# Patient Record
Sex: FEMALE | Race: WHITE | NOT HISPANIC OR LATINO | Employment: UNEMPLOYED | URBAN - METROPOLITAN AREA
[De-identification: names, ages, dates, MRNs, and addresses within clinical notes are randomized per-mention and may not be internally consistent; named-entity substitution may affect disease eponyms.]

---

## 2017-12-29 ENCOUNTER — HOSPITAL ENCOUNTER (EMERGENCY)
Facility: MEDICAL CENTER | Age: 22
End: 2017-12-30
Attending: EMERGENCY MEDICINE
Payer: MEDICAID

## 2017-12-29 ENCOUNTER — APPOINTMENT (OUTPATIENT)
Dept: RADIOLOGY | Facility: MEDICAL CENTER | Age: 22
End: 2017-12-29
Attending: EMERGENCY MEDICINE
Payer: MEDICAID

## 2017-12-29 DIAGNOSIS — N30.00 ACUTE CYSTITIS WITHOUT HEMATURIA: ICD-10-CM

## 2017-12-29 DIAGNOSIS — R10.9 ABDOMINAL PAIN IN PREGNANCY, SECOND TRIMESTER: ICD-10-CM

## 2017-12-29 DIAGNOSIS — O26.892 ABDOMINAL PAIN IN PREGNANCY, SECOND TRIMESTER: ICD-10-CM

## 2017-12-29 LAB
ALBUMIN SERPL BCP-MCNC: 3.6 G/DL (ref 3.2–4.9)
ALBUMIN/GLOB SERPL: 1.4 G/DL
ALP SERPL-CCNC: 37 U/L (ref 30–99)
ALT SERPL-CCNC: 7 U/L (ref 2–50)
ANION GAP SERPL CALC-SCNC: 5 MMOL/L (ref 0–11.9)
APPEARANCE UR: ABNORMAL
AST SERPL-CCNC: 10 U/L (ref 12–45)
BACTERIA #/AREA URNS HPF: ABNORMAL /HPF
BACTERIA GENITAL QL WET PREP: NORMAL
BASOPHILS # BLD AUTO: 0.4 % (ref 0–1.8)
BASOPHILS # BLD: 0.02 K/UL (ref 0–0.12)
BILIRUB SERPL-MCNC: 0.5 MG/DL (ref 0.1–1.5)
BILIRUB UR QL STRIP.AUTO: NEGATIVE
BUN SERPL-MCNC: 12 MG/DL (ref 8–22)
CALCIUM SERPL-MCNC: 9.4 MG/DL (ref 8.5–10.5)
CAOX CRY #/AREA URNS HPF: ABNORMAL /HPF
CHLORIDE SERPL-SCNC: 106 MMOL/L (ref 96–112)
CO2 SERPL-SCNC: 25 MMOL/L (ref 20–33)
COLOR UR: YELLOW
CREAT SERPL-MCNC: 0.61 MG/DL (ref 0.5–1.4)
CULTURE IF INDICATED INDCX: YES UA CULTURE
EOSINOPHIL # BLD AUTO: 0.06 K/UL (ref 0–0.51)
EOSINOPHIL NFR BLD: 1.3 % (ref 0–6.9)
EPI CELLS #/AREA URNS HPF: ABNORMAL /HPF
ERYTHROCYTE [DISTWIDTH] IN BLOOD BY AUTOMATED COUNT: 46.5 FL (ref 35.9–50)
GFR SERPL CREATININE-BSD FRML MDRD: >60 ML/MIN/1.73 M 2
GLOBULIN SER CALC-MCNC: 2.6 G/DL (ref 1.9–3.5)
GLUCOSE SERPL-MCNC: 76 MG/DL (ref 65–99)
GLUCOSE UR STRIP.AUTO-MCNC: NEGATIVE MG/DL
HCG SERPL QL: POSITIVE
HCT VFR BLD AUTO: 34.1 % (ref 37–47)
HGB BLD-MCNC: 11.6 G/DL (ref 12–16)
HYALINE CASTS #/AREA URNS LPF: ABNORMAL /LPF
IMM GRANULOCYTES # BLD AUTO: 0.01 K/UL (ref 0–0.11)
IMM GRANULOCYTES NFR BLD AUTO: 0.2 % (ref 0–0.9)
KETONES UR STRIP.AUTO-MCNC: NEGATIVE MG/DL
LEUKOCYTE ESTERASE UR QL STRIP.AUTO: ABNORMAL
LIPASE SERPL-CCNC: 33 U/L (ref 11–82)
LYMPHOCYTES # BLD AUTO: 1.41 K/UL (ref 1–4.8)
LYMPHOCYTES NFR BLD: 29.6 % (ref 22–41)
MCH RBC QN AUTO: 32 PG (ref 27–33)
MCHC RBC AUTO-ENTMCNC: 34 G/DL (ref 33.6–35)
MCV RBC AUTO: 94.2 FL (ref 81.4–97.8)
MICRO URNS: ABNORMAL
MONOCYTES # BLD AUTO: 0.41 K/UL (ref 0–0.85)
MONOCYTES NFR BLD AUTO: 8.6 % (ref 0–13.4)
NEUTROPHILS # BLD AUTO: 2.85 K/UL (ref 2–7.15)
NEUTROPHILS NFR BLD: 59.9 % (ref 44–72)
NITRITE UR QL STRIP.AUTO: NEGATIVE
NRBC # BLD AUTO: 0 K/UL
NRBC BLD-RTO: 0 /100 WBC
PH UR STRIP.AUTO: 6 [PH]
PLATELET # BLD AUTO: 174 K/UL (ref 164–446)
PMV BLD AUTO: 11.2 FL (ref 9–12.9)
POTASSIUM SERPL-SCNC: 3.9 MMOL/L (ref 3.6–5.5)
PROT SERPL-MCNC: 6.2 G/DL (ref 6–8.2)
PROT UR QL STRIP: NEGATIVE MG/DL
RBC # BLD AUTO: 3.62 M/UL (ref 4.2–5.4)
RBC # URNS HPF: ABNORMAL /HPF
RBC UR QL AUTO: NEGATIVE
SIGNIFICANT IND 70042: NORMAL
SITE SITE: NORMAL
SODIUM SERPL-SCNC: 136 MMOL/L (ref 135–145)
SOURCE SOURCE: NORMAL
SP GR UR STRIP.AUTO: 1.02
UROBILINOGEN UR STRIP.AUTO-MCNC: 0.2 MG/DL
WBC # BLD AUTO: 4.8 K/UL (ref 4.8–10.8)
WBC #/AREA URNS HPF: ABNORMAL /HPF

## 2017-12-29 PROCEDURE — 87591 N.GONORRHOEAE DNA AMP PROB: CPT

## 2017-12-29 PROCEDURE — 81001 URINALYSIS AUTO W/SCOPE: CPT

## 2017-12-29 PROCEDURE — 85025 COMPLETE CBC W/AUTO DIFF WBC: CPT

## 2017-12-29 PROCEDURE — 36415 COLL VENOUS BLD VENIPUNCTURE: CPT

## 2017-12-29 PROCEDURE — 84702 CHORIONIC GONADOTROPIN TEST: CPT

## 2017-12-29 PROCEDURE — 99284 EMERGENCY DEPT VISIT MOD MDM: CPT

## 2017-12-29 PROCEDURE — A9270 NON-COVERED ITEM OR SERVICE: HCPCS | Performed by: EMERGENCY MEDICINE

## 2017-12-29 PROCEDURE — 87491 CHLMYD TRACH DNA AMP PROBE: CPT

## 2017-12-29 PROCEDURE — 700102 HCHG RX REV CODE 250 W/ 637 OVERRIDE(OP): Performed by: EMERGENCY MEDICINE

## 2017-12-29 PROCEDURE — 83690 ASSAY OF LIPASE: CPT

## 2017-12-29 PROCEDURE — 80053 COMPREHEN METABOLIC PANEL: CPT

## 2017-12-29 PROCEDURE — 87086 URINE CULTURE/COLONY COUNT: CPT

## 2017-12-29 PROCEDURE — 84703 CHORIONIC GONADOTROPIN ASSAY: CPT

## 2017-12-29 RX ORDER — NITROFURANTOIN 25; 75 MG/1; MG/1
100 CAPSULE ORAL ONCE
Status: COMPLETED | OUTPATIENT
Start: 2017-12-29 | End: 2017-12-29

## 2017-12-29 RX ADMIN — NITROFURANTOIN (MONOHYDRATE/MACROCRYSTALS) 100 MG: 75; 25 CAPSULE ORAL at 22:52

## 2017-12-30 VITALS
WEIGHT: 188.93 LBS | TEMPERATURE: 98 F | BODY MASS INDEX: 26.45 KG/M2 | HEIGHT: 71 IN | SYSTOLIC BLOOD PRESSURE: 105 MMHG | HEART RATE: 79 BPM | OXYGEN SATURATION: 98 % | DIASTOLIC BLOOD PRESSURE: 53 MMHG | RESPIRATION RATE: 16 BRPM

## 2017-12-30 LAB
B-HCG SERPL-ACNC: ABNORMAL MIU/ML (ref 0–5)
C TRACH DNA SPEC QL NAA+PROBE: NEGATIVE
N GONORRHOEA DNA SPEC QL NAA+PROBE: NEGATIVE
SPECIMEN SOURCE: NORMAL

## 2017-12-30 PROCEDURE — 76817 TRANSVAGINAL US OBSTETRIC: CPT

## 2017-12-30 RX ORDER — NITROFURANTOIN 25; 75 MG/1; MG/1
100 CAPSULE ORAL 2 TIMES DAILY
Qty: 14 CAP | Refills: 0 | Status: SHIPPED | OUTPATIENT
Start: 2017-12-30 | End: 2018-01-06

## 2017-12-30 NOTE — ED NOTES
Pt ambulatory to triage. Pt states that she is pregnant, unsure about due date. LMP October. Denies pre-elsi care. Pt states that she has a shooting pain from bilateral lower abdomen that radiates to bilateral upper quadrant abdomen. Pt has had 2 other pregnancies and never experienced this pain before. Pt states that the pain has been occurring for about a month and is worse when she coughs and sneezes. Denies vaginal bleeding.     Chief Complaint   Patient presents with   • Pregnancy   • Abdominal Pain     Pt placed in lobby, updated on triage process. Pt educated to notified RN or triage tech if changes in condition.

## 2017-12-30 NOTE — ED PROVIDER NOTES
"ED Provider Note    Scribed for Alexsander Grubbs M.D. by Alexsander Grubbs. 2017,  10:10 PM.    CHIEF COMPLAINT  Chief Complaint   Patient presents with   • Pregnancy   • Abdominal Pain       HPI  Natalie Pop is a 22 y.o.  female who presents to the Emergency Department for about 1 month of bilateral crampy lower quadrant abdominal pain. Pain is moderate, worse with coughing or Valsalva, not associated with dysuria, hematuria, constipation or diarrhea. Pain is nonradiating. She reports that this is new compared to her previous pregnancies. She denies vaginal bleeding. She reports more than physiologic whitish vaginal discharge.    Previous OBGYN was Erich Beltran. She has a 1st ObGyn appointment with [facility] on , just a few days from now.    REVIEW OF SYSTEMS  See HPI for further details. All other systems are negative.     PAST MEDICAL HISTORY   has a past medical history of Chlamydia infection (); Inflammatory disease of ovary, fallopian tube, pelvic cellular tissue, and peritoneum; and Tobacco abuse.    SOCIAL HISTORY  Social History     Social History Main Topics   • Smoking status: Current Every Day Smoker     Packs/day: 1.00     Years: 7.00     Types: Cigarettes     Start date: 2015   • Smokeless tobacco: Never Used   • Alcohol use No   • Drug use: No   • Sexual activity: Not on file     History   Drug Use No       SURGICAL HISTORY   has a past surgical history that includes gyn surgery () and repeat c section (2015).    CURRENT MEDICATIONS  Home Medications    **Home medications have not yet been reviewed for this encounter**         ALLERGIES  Allergies   Allergen Reactions   • Peanuts [Peanut Oil] Anaphylaxis   • Pcn [Penicillins]        PHYSICAL EXAM  VITAL SIGNS: /53   Pulse 79   Temp 36.7 °C (98 °F)   Resp 16   Ht 1.803 m (5' 11\")   Wt 85.7 kg (188 lb 15 oz)   LMP 10/29/2017 (Approximate)   SpO2 98%   BMI 26.35 kg/m²   Pulse " ox interpretation: I interpret this pulse ox as normal.  Constitutional: Alert in no apparent distress.  HENT: No signs of trauma, Bilateral external ears normal, Nose normal.   Eyes: Conjunctiva normal, Non-icteric.   Neck: Normal range of motion, Supple, No stridor.   Lymphatic: No lymphadenopathy noted.   Cardiovascular: Regular rate and rhythm, no murmurs.   Thorax & Lungs: Normal breath sounds, No respiratory distress, No wheezing, No chest tenderness.   Abdomen: Gravid abdomen, well above the pelvic brim, near the umbilicus, suggestive of dates more advanced than by LMP, Bowel sounds normal, Soft, mild suprapubic tenderness, No masses, No pulsatile masses. No peritoneal signs.  Pelvic: Normal external genitalia. Normal vaginal canal, normal gravid closed cervix. Slightly more than physiologic amount of white tinged, mostly mucoid discharge.  Skin: Warm, Dry, No erythema, No rash.   Back: No midline bony tenderness.   Extremities: Intact distal pulses, No edema, No cyanosis.  Musculoskeletal: Good range of motion in all major joints. No or major deformities noted.   Neurologic: Alert , Normal motor function, Normal sensory function, No focal deficits noted.   Psychiatric: Affect normal, Judgment normal, Mood normal.     DIAGNOSTIC STUDIES / PROCEDURES      LABS  Labs Reviewed   CBC WITH DIFFERENTIAL - Abnormal; Notable for the following:        Result Value    RBC 3.62 (*)     Hemoglobin 11.6 (*)     Hematocrit 34.1 (*)     All other components within normal limits   COMP METABOLIC PANEL - Abnormal; Notable for the following:     AST(SGOT) 10 (*)     All other components within normal limits   URINALYSIS,CULTURE IF INDICATED - Abnormal; Notable for the following:     Character Cloudy (*)     Leukocyte Esterase Trace (*)     All other components within normal limits   HCG QUAL SERUM - Abnormal; Notable for the following:     Beta-Hcg Qualitative Serum Positive (*)     All other components within normal limits    URINE MICROSCOPIC (W/UA) - Abnormal; Notable for the following:     WBC 5-10 (*)     Bacteria Moderate (*)     Epithelial Cells Many (*)     All other components within normal limits   LIPASE   URINE CULTURE(NEW)   ESTIMATED GFR   HCG QUANTITATIVE   WET PREP   CHLAMYDIA/GC PCR URINE OR SWAB     All labs reviewed by me.    RADIOLOGY  US-OB PELVIS TRANSVAGINAL   Final Result         1.  Live intrauterine pregnancy at calculated gestational age 17 weeks 3 days for estimated date of delivery June 5, 2018.   2.  No gross anatomic abnormality on very limited anatomic survey. Recommend complete anatomic survey at 20 weeks.        The radiologist's interpretation of all radiological studies have been reviewed by me.    COURSE & MEDICAL DECISION MAKING  Nursing notes, VS, PMSFHx reviewed in chart.     10:10 PM Patient seen and examined at bedside. Differential diagnosis includes but is not limited toAbdominal pain in pregnancy, urinary tract infection, pelvic inflammatory disease/pelvic infection/sinusitis. Ordered for transvaginal ultrasound, laboratory tests including urinalysis to evaluate.    1:34 AM this patient's ultrasound confirms a normal appearing live IUP at 17 weeks 3 days. It is otherwise unremarkable. The laboratory tests are positive for urinary tract infection, which we started treating with Bactrim here, and will discharge with a prescription for Bactrim. I reviewed the patient's findings with her, and her boyfriend, at the bedside. We discussed quitting smoking, which we had discussed at her initial history and physical, as well as the antibiotics she is being given, Tylenol for aches and pains, and the importance of follow-up at her initial prenatal visit on January 2.     The patient will return for new or worsening symptoms and is stable at the time of discharge.    The patient is referred to a primary physician for blood pressure management, diabetic screening, and for all other preventative health  concerns.      DISPOSITION:  Patient will be discharged home in stable condition.    FOLLOW UP:  [facility]  [address] [place] [address] [address]  [facility] [id number] [phone]  Call in 1 day        OUTPATIENT MEDICATIONS:  New Prescriptions    NITROFURANTOIN MONOHYDR MACRO (MACROBID) 100 MG CAP    Take 1 Cap by mouth 2 times a day for 7 days.         FINAL IMPRESSION  1. Acute cystitis without hematuria    2. Abdominal pain in pregnancy, second trimester

## 2017-12-30 NOTE — DISCHARGE INSTRUCTIONS
Your ultrasound showed a healthy-appearing pregnancy, at 4 months, one week, and 3 days. You also have a urinary tract infection. Your labs did not show any other pelvic infections or other problems. Use the antibiotics we have prescribed. You can take Tylenol during pregnancy for aches and pains.  It is very important that you quit smoking. Follow-up at your upcoming ObGyn appointment. Return to the emergency department for severe symptoms that cannot be managed by your regular doctor.    Abdominal Pain During Pregnancy  Belly (abdominal) pain is common during pregnancy. Most of the time, it is not a serious problem. Other times, it can be a sign that something is wrong with the pregnancy. Always tell your doctor if you have belly pain.  HOME CARE  Monitor your belly pain for any changes. The following actions may help you feel better:  · Do not have sex (intercourse) or put anything in your vagina until you feel better.  · Rest until your pain stops.  · Drink clear fluids if you feel sick to your stomach (nauseous). Do not eat solid food until you feel better.  · Only take medicine as told by your doctor.  · Keep all doctor visits as told.  GET HELP RIGHT AWAY IF:   · You are bleeding, leaking fluid, or pieces of tissue come out of your vagina.  · You have more pain or cramping.  · You keep throwing up (vomiting).  · You have pain when you pee (urinate) or have blood in your pee.  · You have a fever.  · You do not feel your baby moving as much.  · You feel very weak or feel like passing out.  · You have trouble breathing, with or without belly pain.  · You have a very bad headache and belly pain.  · You have fluid leaking from your vagina and belly pain.  · You keep having watery poop (diarrhea).  · Your belly pain does not go away after resting, or the pain gets worse.  MAKE SURE YOU:   · Understand these instructions.  · Will watch your condition.  · Will get help right away if you are not doing well or get  worse.     This information is not intended to replace advice given to you by your health care provider. Make sure you discuss any questions you have with your health care provider.     Document Released: 12/06/2010 Document Revised: 08/20/2014 Document Reviewed: 07/17/2014  Elsevier Interactive Patient Education ©2016 [employer]

## 2017-12-31 LAB
BACTERIA UR CULT: NORMAL
SIGNIFICANT IND 70042: NORMAL
SITE SITE: NORMAL
SOURCE SOURCE: NORMAL

## 2018-01-05 ENCOUNTER — APPOINTMENT (OUTPATIENT)
Dept: OBGYN | Facility: CLINIC | Age: 23
End: 2018-01-05

## 2018-01-25 ENCOUNTER — INITIAL PRENATAL (OUTPATIENT)
Dept: OBGYN | Facility: CLINIC | Age: 23
End: 2018-01-25
Payer: MEDICAID

## 2018-01-25 ENCOUNTER — HOSPITAL ENCOUNTER (OUTPATIENT)
Dept: LAB | Facility: MEDICAL CENTER | Age: 23
End: 2018-01-25
Attending: NURSE PRACTITIONER
Payer: MEDICAID

## 2018-01-25 ENCOUNTER — HOSPITAL ENCOUNTER (OUTPATIENT)
Facility: MEDICAL CENTER | Age: 23
End: 2018-01-25
Attending: NURSE PRACTITIONER
Payer: MEDICAID

## 2018-01-25 VITALS
WEIGHT: 190 LBS | SYSTOLIC BLOOD PRESSURE: 102 MMHG | DIASTOLIC BLOOD PRESSURE: 50 MMHG | BODY MASS INDEX: 26.6 KG/M2 | HEIGHT: 71 IN

## 2018-01-25 DIAGNOSIS — Z34.82 ENCOUNTER FOR SUPERVISION OF OTHER NORMAL PREGNANCY IN SECOND TRIMESTER: ICD-10-CM

## 2018-01-25 DIAGNOSIS — O34.219 HISTORY OF CESAREAN DELIVERY AFFECTING PREGNANCY: ICD-10-CM

## 2018-01-25 LAB
ABO GROUP BLD: NORMAL
APPEARANCE UR: CLEAR
APPEARANCE UR: NORMAL
BACTERIA #/AREA URNS HPF: NEGATIVE /HPF
BASOPHILS # BLD AUTO: 0.2 % (ref 0–1.8)
BASOPHILS # BLD: 0.01 K/UL (ref 0–0.12)
BILIRUB UR QL STRIP.AUTO: NEGATIVE
BILIRUB UR STRIP-MCNC: NORMAL MG/DL
BLD GP AB SCN SERPL QL: NORMAL
COLOR UR AUTO: NORMAL
COLOR UR: YELLOW
CULTURE IF INDICATED INDCX: YES UA CULTURE
EOSINOPHIL # BLD AUTO: 0.05 K/UL (ref 0–0.51)
EOSINOPHIL NFR BLD: 1 % (ref 0–6.9)
EPI CELLS #/AREA URNS HPF: ABNORMAL /HPF
ERYTHROCYTE [DISTWIDTH] IN BLOOD BY AUTOMATED COUNT: 44.8 FL (ref 35.9–50)
GLUCOSE UR STRIP.AUTO-MCNC: NEGATIVE MG/DL
GLUCOSE UR STRIP.AUTO-MCNC: NORMAL MG/DL
HBV SURFACE AG SER QL: NEGATIVE
HCT VFR BLD AUTO: 35.2 % (ref 37–47)
HGB BLD-MCNC: 11.6 G/DL (ref 12–16)
HIV 1+2 AB+HIV1 P24 AG SERPL QL IA: NON REACTIVE
HYALINE CASTS #/AREA URNS LPF: ABNORMAL /LPF
IMM GRANULOCYTES # BLD AUTO: 0.02 K/UL (ref 0–0.11)
IMM GRANULOCYTES NFR BLD AUTO: 0.4 % (ref 0–0.9)
KETONES UR STRIP.AUTO-MCNC: 40 MG/DL
KETONES UR STRIP.AUTO-MCNC: NORMAL MG/DL
LEUKOCYTE ESTERASE UR QL STRIP.AUTO: ABNORMAL
LEUKOCYTE ESTERASE UR QL STRIP.AUTO: NORMAL
LYMPHOCYTES # BLD AUTO: 1.11 K/UL (ref 1–4.8)
LYMPHOCYTES NFR BLD: 21.6 % (ref 22–41)
MCH RBC QN AUTO: 30.9 PG (ref 27–33)
MCHC RBC AUTO-ENTMCNC: 33 G/DL (ref 33.6–35)
MCV RBC AUTO: 93.9 FL (ref 81.4–97.8)
MICRO URNS: ABNORMAL
MONOCYTES # BLD AUTO: 0.34 K/UL (ref 0–0.85)
MONOCYTES NFR BLD AUTO: 6.6 % (ref 0–13.4)
NEUTROPHILS # BLD AUTO: 3.62 K/UL (ref 2–7.15)
NEUTROPHILS NFR BLD: 70.2 % (ref 44–72)
NITRITE UR QL STRIP.AUTO: NEGATIVE
NITRITE UR QL STRIP.AUTO: NORMAL
NRBC # BLD AUTO: 0 K/UL
NRBC BLD-RTO: 0 /100 WBC
PH UR STRIP.AUTO: 6 [PH]
PH UR STRIP.AUTO: 6.5 [PH] (ref 5–8)
PLATELET # BLD AUTO: 156 K/UL (ref 164–446)
PMV BLD AUTO: 12 FL (ref 9–12.9)
PROT UR QL STRIP: NEGATIVE MG/DL
PROT UR QL STRIP: NORMAL MG/DL
RBC # BLD AUTO: 3.75 M/UL (ref 4.2–5.4)
RBC # URNS HPF: ABNORMAL /HPF
RBC UR QL AUTO: NEGATIVE
RBC UR QL AUTO: NORMAL
RENAL EPI CELLS #/AREA URNS HPF: ABNORMAL /HPF
RH BLD: NORMAL
RUBV AB SER QL: 188.5 IU/ML
SP GR UR STRIP.AUTO: 1.01
SP GR UR STRIP.AUTO: 1.02
TREPONEMA PALLIDUM IGG+IGM AB [PRESENCE] IN SERUM OR PLASMA BY IMMUNOASSAY: NON REACTIVE
UROBILINOGEN UR STRIP-MCNC: NORMAL MG/DL
UROBILINOGEN UR STRIP.AUTO-MCNC: 0.2 MG/DL
WBC # BLD AUTO: 5.2 K/UL (ref 4.8–10.8)
WBC #/AREA URNS HPF: ABNORMAL /HPF

## 2018-01-25 PROCEDURE — 59401 PR NEW OB VISIT: CPT | Performed by: NURSE PRACTITIONER

## 2018-01-25 PROCEDURE — 86900 BLOOD TYPING SEROLOGIC ABO: CPT

## 2018-01-25 PROCEDURE — 81001 URINALYSIS AUTO W/SCOPE: CPT

## 2018-01-25 PROCEDURE — 87624 HPV HI-RISK TYP POOLED RSLT: CPT

## 2018-01-25 PROCEDURE — 87086 URINE CULTURE/COLONY COUNT: CPT

## 2018-01-25 PROCEDURE — 86780 TREPONEMA PALLIDUM: CPT

## 2018-01-25 PROCEDURE — 85025 COMPLETE CBC W/AUTO DIFF WBC: CPT

## 2018-01-25 PROCEDURE — 86850 RBC ANTIBODY SCREEN: CPT

## 2018-01-25 PROCEDURE — 36415 COLL VENOUS BLD VENIPUNCTURE: CPT

## 2018-01-25 PROCEDURE — 86762 RUBELLA ANTIBODY: CPT

## 2018-01-25 PROCEDURE — 87491 CHLMYD TRACH DNA AMP PROBE: CPT

## 2018-01-25 PROCEDURE — 86901 BLOOD TYPING SEROLOGIC RH(D): CPT

## 2018-01-25 PROCEDURE — 87389 HIV-1 AG W/HIV-1&-2 AB AG IA: CPT

## 2018-01-25 PROCEDURE — 87340 HEPATITIS B SURFACE AG IA: CPT

## 2018-01-25 PROCEDURE — 88175 CYTOPATH C/V AUTO FLUID REDO: CPT

## 2018-01-25 PROCEDURE — 81511 FTL CGEN ABNOR FOUR ANAL: CPT

## 2018-01-25 PROCEDURE — 81002 URINALYSIS NONAUTO W/O SCOPE: CPT | Performed by: NURSE PRACTITIONER

## 2018-01-25 PROCEDURE — 87591 N.GONORRHOEAE DNA AMP PROB: CPT

## 2018-01-25 NOTE — PROGRESS NOTES
Pt here today for NOB visit  LMP: Unknown. Pt had US done at [facility] on 01/25/2018  WT:190 lb  BP: 102/50  Pt states having nausea and heartburn.   Influenza vaccine offered today. Pt declines.   Good # [phone]

## 2018-01-25 NOTE — PROGRESS NOTES
Subjective:   Natalie Pop is a 22 y.o.  who presents for her new OB exam.  She is 21w2d with an NIKHIL of Estimated Date of Delivery: 18 by 17 week US in ED with unsure LMP. She is feeling well and has no concerns at this time. Denies VB, LOF, contractions or pain. No ER visits or previous care in this pregnancy. Denies dysuria, vaginal DC, fever. Reports good fetal movement. Desires AFP.  Declines CF.      Past Medical History:   Diagnosis Date   • Allergy     PCN and peanuts   • Inflammatory disease of ovary, fallopian tube, pelvic cellular tissue, and peritoneum        Past Surgical History:   Procedure Laterality Date   • REPEAT C SECTION  2015    Performed by Erich Beltran M.D. at [facility]   • PRIMARY C SECTION  2012   • GYN SURGERY  2012            OB History    Para Term  AB Living   4 2 2   1 2   SAB TAB Ectopic Molar Multiple Live Births     1       2      # Outcome Date GA Lbr Hussain/2nd Weight Sex Delivery Anes PTL Lv   4 Current            3 Term 04/13/15 40w0d  3.033 kg (6 lb 11 oz) F CS-LTranv Spinal N LONG      Birth Comments: C/Section for repeat    2 Term 12 40w0d  3.487 kg (7 lb 11 oz) M CS-LTranv Spinal N LONG      Birth Comments: baby's heart rate was going down   1 TAB  4w0d                  Gynecological Hx: Denies any hx of STIs, including HSV. Denies any vulvovaginal disorders and no hx of abnormal cervical cytology. Last pap unsure    Sexual Hx: One current male partner, who is FOB     Contraceptive Hx: Has used pills in the past and has since discontinued use.     Family History   Problem Relation Age of Onset   • No Known Problems Paternal Grandmother      Denies any genetic disorders in family history.     Social History     Social History   • Marital status:      Spouse name: N/A   • Number of children: N/A   • Years of education: N/A     Occupational History   • Not on file.     Social History Main Topics  "  • Smoking status: Former Smoker     Packs/day: 1.00     Years: 10.00     Types: Cigarettes     Start date: 2015     Quit date: 2017   • Smokeless tobacco: Never Used   • Alcohol use No   • Drug use: No   • Sexual activity: Yes     Partners: Male      Comment: Unplanned pregnancy     Other Topics Concern   • Not on file     Social History Narrative   • No narrative on file       FOB is involved and lives with Natalie Pop.  Pregnancy is unplanned but desired.    She is currently not working, denies any heavy lifting or exposure to potential teratogens like environmental or occupational toxins.   Denies alcohol use, drug use, or tobacco use in pregnancy. Quit tobacco mid January   Denies any current or hx of sexual, emotional or physical abuse or trauma.     Current Medications: PNV   Allergies: Reports allergy to penicillin. No other allergies. Got hives and anaphylaxis with PCN as child.     Objective:      Vitals:    18 0933   BP: 102/50   Weight: 86.2 kg (190 lb)   Height: 1.803 m (5' 11\")        See Prenatal Physical and Prenatal Vitals  UA WNL today      Assessment:      1.  IUP @ 21w2d per US at 17 weeks      2.  S=D      3.  See problem list as follows       Patient Active Problem List    Diagnosis Date Noted   • History of  delivery affecting pregnancy 2018         Plan:   - GC/CT & pap done today   - C/s consent signed, does not desire BTL  - AFP ASAP   - Prenatal labs ordered - lab slip given  - Discussed PNV, nutrition, adequate water intake, and exercise/weight gain in pregnancy  - NOB informational packet with anticipatory guidance given  - Information on Centering Pregnancy given, pt not appropriate   - S/sx of pregnancy warning signs and PTL precautions given  - Complete OB US in next available  wks  - Return to [facility] in 4 wks  "

## 2018-01-25 NOTE — PATIENT INSTRUCTIONS
- GC/CT & pap done today   - C/s consent signed, does not desire BTL  - Prenatal labs ordered - lab slip given  - Discussed PNV, nutrition, adequate water intake, and exercise/weight gain in pregnancy  - NOB informational packet with anticipatory guidance given  - Information on Centering Pregnancy given, pt not appropriate   - S/sx of pregnancy warning signs and PTL precautions given  - Complete OB US in next available  wks  - Return to [facility] in 4 wks   Ischemic cardiomyopathy

## 2018-01-25 NOTE — LETTER
Cystic Fibrosis Carrier Testing  Natalie Pop    The following information is about a blood test that can be done to determine if you and/or your partner carry the gene for cystic fibrosis.    WHAT IS CYSTIC FIBROSIS?  · Cystic fibrosis (CF) is an inherited disease that affects more than 25,000 [place] children and young adults.  · Symptoms of CF vary but include lung congestion, pneumonia, diarrhea and poor growth.  Most people with CF have severe medical problems and some die at a young age.  Others have so few symptoms they are unaware they have CF.  · CF does not affect intelligence.  · Although there is no cure for CF at this time, scientists are making progress in improving treatment and in searching for a cure.  In the past many people with CF  at a very young age.  Today, many are living into their 20’s and 30’s.    IS THERE A CHANCE MY BABY COULD HAVE CYSTIC FIBROSIS?  · You can have a child with CF even if there is no history in your family (see chart below).  · CF testing can help determine if you are a carrier and at risk to have a child with CF.  Note: if both parents are carriers, there is a 1 in 4 (25%) chance with each pregnancy that they will have a child with CF.  · Carriers have one normal CF gene and one altered CF gene.  · People with CF have two altered CF genes.  · Most people have two normal copies of the CF gene.    Approximate risk that a couple with no family history of cystic fibrosis will have a child with cystic fibrosis:    Ethnic background / Risk     couple:  1 in 2,500   couple:  1 in 15,000            couple:  1 in 8,000     American couple:  1 in 32,000     WHAT TESTING IS AVAILABLE?  · There is a blood test that can be done to find out if you or your partner is a carrier.  · It is important to understand that CF carrier testing does not detect all CF carriers.  · If the test shows that you are both CF carriers, you unborn  baby can be tested to find out if the baby has CF.    HOW MUCH DOES IT COST TO HAVE CYSTIC FIBROSIS CARRIER TESTING?  · Cost and insurance coverage for CF carrier testing vary depending upon the laboratory used and your insurance policy.  · The average cost for CF carrier testing is $300 per person.  · Your genetic counselor can provide you with more information about cystic fibrosis carrier testing.    _____  Yes, I am interested in discussing carrier testing with a genetic counselor.    _____  No, I am not interested in CF carrier testing or in receiving more information about CF carrier testing.      Client signature: ________________________________________  1/25/2018

## 2018-01-26 DIAGNOSIS — O34.219 HISTORY OF CESAREAN DELIVERY AFFECTING PREGNANCY: ICD-10-CM

## 2018-01-26 DIAGNOSIS — Z34.82 ENCOUNTER FOR SUPERVISION OF OTHER NORMAL PREGNANCY IN SECOND TRIMESTER: ICD-10-CM

## 2018-01-26 PROBLEM — D69.6 THROMBOCYTOPENIA AFFECTING PREGNANCY (HCC): Status: ACTIVE | Noted: 2018-01-26

## 2018-01-26 PROBLEM — O99.119 THROMBOCYTOPENIA AFFECTING PREGNANCY (HCC): Status: ACTIVE | Noted: 2018-01-26

## 2018-01-27 LAB
BACTERIA UR CULT: NORMAL
C TRACH DNA GENITAL QL NAA+PROBE: NEGATIVE
CYTOLOGY REG CYTOL: NORMAL
HPV HR 12 DNA CVX QL NAA+PROBE: NEGATIVE
HPV16 DNA SPEC QL NAA+PROBE: NEGATIVE
HPV18 DNA SPEC QL NAA+PROBE: NEGATIVE
N GONORRHOEA DNA GENITAL QL NAA+PROBE: NEGATIVE
SIGNIFICANT IND 70042: NORMAL
SITE SITE: NORMAL
SOURCE SOURCE: NORMAL
SPECIMEN SOURCE: NORMAL
SPECIMEN SOURCE: NORMAL

## 2018-01-30 LAB
# FETUSES US: NORMAL
AFP MOM SERPL: 0.61
AFP SERPL-MCNC: 36 NG/ML
AGE - REPORTED: 24.8 YR
GA METHOD: NORMAL
GA: 21.29 WEEKS
HCG MOM SERPL: 1.55
HCG SERPL-ACNC: NORMAL IU/L
IDDM PATIENT QL: NO
INHIBIN A MOM SERPL: 0.66
INHIBIN A SERPL-MCNC: 122 PG/ML
INTEGRATED SCN PATIENT-IMP: NORMAL
PATHOLOGY STUDY: NORMAL
U ESTRIOL MOM SERPL: 0.99
U ESTRIOL SERPL-MCNC: 2.38 NG/ML

## 2018-02-08 ENCOUNTER — APPOINTMENT (OUTPATIENT)
Dept: RADIOLOGY | Facility: IMAGING CENTER | Age: 23
End: 2018-02-08
Attending: NURSE PRACTITIONER
Payer: MEDICAID

## 2018-02-08 DIAGNOSIS — Z34.82 ENCOUNTER FOR SUPERVISION OF OTHER NORMAL PREGNANCY IN SECOND TRIMESTER: ICD-10-CM

## 2018-02-08 DIAGNOSIS — O34.219 HISTORY OF CESAREAN DELIVERY AFFECTING PREGNANCY: ICD-10-CM

## 2018-02-08 PROCEDURE — 76805 OB US >/= 14 WKS SNGL FETUS: CPT | Performed by: OBSTETRICS & GYNECOLOGY

## 2018-04-10 ENCOUNTER — HOSPITAL ENCOUNTER (OUTPATIENT)
Facility: MEDICAL CENTER | Age: 23
End: 2018-04-10
Attending: OBSTETRICS & GYNECOLOGY | Admitting: OBSTETRICS & GYNECOLOGY
Payer: MEDICAID

## 2018-04-10 VITALS
TEMPERATURE: 98.2 F | WEIGHT: 175 LBS | DIASTOLIC BLOOD PRESSURE: 57 MMHG | HEIGHT: 71 IN | SYSTOLIC BLOOD PRESSURE: 112 MMHG | BODY MASS INDEX: 24.5 KG/M2 | HEART RATE: 88 BPM

## 2018-04-10 LAB
APPEARANCE UR: ABNORMAL
APPEARANCE UR: ABNORMAL
BACTERIA #/AREA URNS HPF: ABNORMAL /HPF
BASOPHILS # BLD AUTO: 0.4 % (ref 0–1.8)
BASOPHILS # BLD: 0.03 K/UL (ref 0–0.12)
BILIRUB UR QL STRIP.AUTO: NEGATIVE
CAOX CRY #/AREA URNS HPF: ABNORMAL /HPF
COLOR UR AUTO: YELLOW
COLOR UR: YELLOW
EOSINOPHIL # BLD AUTO: 0.07 K/UL (ref 0–0.51)
EOSINOPHIL NFR BLD: 0.9 % (ref 0–6.9)
EPI CELLS #/AREA URNS HPF: ABNORMAL /HPF
ERYTHROCYTE [DISTWIDTH] IN BLOOD BY AUTOMATED COUNT: 45.6 FL (ref 35.9–50)
GLUCOSE UR QL STRIP.AUTO: NEGATIVE MG/DL
GLUCOSE UR STRIP.AUTO-MCNC: NEGATIVE MG/DL
HCT VFR BLD AUTO: 35.4 % (ref 37–47)
HGB BLD-MCNC: 11.7 G/DL (ref 12–16)
HYALINE CASTS #/AREA URNS LPF: ABNORMAL /LPF
IMM GRANULOCYTES # BLD AUTO: 0.03 K/UL (ref 0–0.11)
IMM GRANULOCYTES NFR BLD AUTO: 0.4 % (ref 0–0.9)
KETONES UR QL STRIP.AUTO: NEGATIVE MG/DL
KETONES UR STRIP.AUTO-MCNC: NEGATIVE MG/DL
LEUKOCYTE ESTERASE UR QL STRIP.AUTO: ABNORMAL
LEUKOCYTE ESTERASE UR QL STRIP.AUTO: ABNORMAL
LYMPHOCYTES # BLD AUTO: 1.58 K/UL (ref 1–4.8)
LYMPHOCYTES NFR BLD: 19.9 % (ref 22–41)
MCH RBC QN AUTO: 31 PG (ref 27–33)
MCHC RBC AUTO-ENTMCNC: 33.1 G/DL (ref 33.6–35)
MCV RBC AUTO: 93.9 FL (ref 81.4–97.8)
MICRO URNS: ABNORMAL
MONOCYTES # BLD AUTO: 0.49 K/UL (ref 0–0.85)
MONOCYTES NFR BLD AUTO: 6.2 % (ref 0–13.4)
NEUTROPHILS # BLD AUTO: 5.75 K/UL (ref 2–7.15)
NEUTROPHILS NFR BLD: 72.2 % (ref 44–72)
NITRITE UR QL STRIP.AUTO: NEGATIVE
NITRITE UR QL STRIP.AUTO: NEGATIVE
NRBC # BLD AUTO: 0 K/UL
NRBC BLD-RTO: 0 /100 WBC
PH UR STRIP.AUTO: 7 [PH]
PH UR STRIP.AUTO: 7 [PH]
PLATELET # BLD AUTO: 184 K/UL (ref 164–446)
PMV BLD AUTO: 11.5 FL (ref 9–12.9)
PROT UR QL STRIP: NEGATIVE MG/DL
PROT UR QL STRIP: NEGATIVE MG/DL
RBC # BLD AUTO: 3.77 M/UL (ref 4.2–5.4)
RBC # URNS HPF: ABNORMAL /HPF
RBC UR QL AUTO: NEGATIVE
RBC UR QL AUTO: NEGATIVE
SP GR UR STRIP.AUTO: 1.01
SP GR UR: 1.01
UROBILINOGEN UR STRIP.AUTO-MCNC: 0.2 MG/DL
WBC # BLD AUTO: 8 K/UL (ref 4.8–10.8)
WBC #/AREA URNS HPF: ABNORMAL /HPF

## 2018-04-10 PROCEDURE — 81002 URINALYSIS NONAUTO W/O SCOPE: CPT | Mod: XU

## 2018-04-10 PROCEDURE — 700105 HCHG RX REV CODE 258: Performed by: NURSE PRACTITIONER

## 2018-04-10 PROCEDURE — 59025 FETAL NON-STRESS TEST: CPT | Performed by: OBSTETRICS & GYNECOLOGY

## 2018-04-10 PROCEDURE — 81001 URINALYSIS AUTO W/SCOPE: CPT

## 2018-04-10 PROCEDURE — 85025 COMPLETE CBC W/AUTO DIFF WBC: CPT

## 2018-04-10 PROCEDURE — 87086 URINE CULTURE/COLONY COUNT: CPT

## 2018-04-10 RX ORDER — SODIUM CHLORIDE, SODIUM LACTATE, POTASSIUM CHLORIDE, CALCIUM CHLORIDE 600; 310; 30; 20 MG/100ML; MG/100ML; MG/100ML; MG/100ML
1000 INJECTION, SOLUTION INTRAVENOUS ONCE
Status: COMPLETED | OUTPATIENT
Start: 2018-04-10 | End: 2018-04-10

## 2018-04-10 RX ADMIN — SODIUM CHLORIDE, POTASSIUM CHLORIDE, SODIUM LACTATE AND CALCIUM CHLORIDE 1000 ML: 600; 310; 30; 20 INJECTION, SOLUTION INTRAVENOUS at 22:00

## 2018-04-11 NOTE — PROGRESS NOTES
" EDC  32w0d. Pt presents to L&D with complaints of UC's and n/v. Pt taken to triage for assessment. Pt to give urine sample.     TOCO and EFM applied, VSS. Pt states that around 1400 today she started to get nauseous at work. Right around the same time she started feeling back pain and a tightening in her abdomen. Pt reports +FM, denies LOF or VB. Pt states that she was diagnosed with a UTI recently but didn't think she could afford the antibiotic so she to \"azo\" tabs instead. When asked why pt has not returned to [facility], pt states she thought she lost her medicaid and that she would not be seen. Pt educated to follow up with [facility] as they can help her reapply, or set her up to be seen even without insurance. See UA results. Pt positive for CVT tenderness on the left side. Will update MIRIAM Vu on pt status.      MIRIAM Vu in a delivery, will update once available.      MIRIAM Vu updated, orders to start IV and obtain CBC. Will send urine for culture. Okay to give Zofran if pt requests.      RN at bedside, IV started and labs drawn. Pt educated on POC.      MIRIAM Vu updated on lab results, will be down to give pt macrobid prescription.      RN at bedside, IV bolus complete. IV removed. Prescription given, all questions answered. PT encouraged to follow up with [facility] to schedule c/s. PT states that she \"doesn't want to have another c/s\". PT educated on risks associated with not seeking care and a c/s. Pt encouraged to discuss options with [facility] regarding a repeat c/s. Pt educated to take all of her antibiotics even if she starts to feel better. Pt educated on measures to relieve back pain and pelvic discomfort during pregnancy. All questions answered. Pt provided phone #'s to be reached at: [phone] and [facility] [phone].      Pt discharged home in stable condition.           "

## 2018-04-13 LAB
BACTERIA UR CULT: NORMAL
SIGNIFICANT IND 70042: NORMAL
SITE SITE: NORMAL
SOURCE SOURCE: NORMAL

## 2018-05-15 ENCOUNTER — ROUTINE PRENATAL (OUTPATIENT)
Dept: OBGYN | Facility: CLINIC | Age: 23
End: 2018-05-15
Payer: MEDICAID

## 2018-05-15 ENCOUNTER — ROUTINE PRENATAL (OUTPATIENT)
Dept: OBGYN | Facility: CLINIC | Age: 23
End: 2018-05-15
Payer: COMMERCIAL

## 2018-05-15 ENCOUNTER — HOSPITAL ENCOUNTER (OUTPATIENT)
Facility: MEDICAL CENTER | Age: 23
End: 2018-05-15
Attending: NURSE PRACTITIONER
Payer: COMMERCIAL

## 2018-05-15 VITALS — DIASTOLIC BLOOD PRESSURE: 58 MMHG | WEIGHT: 200 LBS | BODY MASS INDEX: 27.89 KG/M2 | SYSTOLIC BLOOD PRESSURE: 108 MMHG

## 2018-05-15 DIAGNOSIS — O09.93 SUPERVISION OF HIGH RISK PREGNANCY IN THIRD TRIMESTER: Primary | ICD-10-CM

## 2018-05-15 DIAGNOSIS — Z91.199 NONCOMPLIANT PREGNANT PATIENT IN THIRD TRIMESTER: ICD-10-CM

## 2018-05-15 DIAGNOSIS — O09.893 NONCOMPLIANT PREGNANT PATIENT IN THIRD TRIMESTER: ICD-10-CM

## 2018-05-15 DIAGNOSIS — O34.219 HISTORY OF CESAREAN DELIVERY AFFECTING PREGNANCY: ICD-10-CM

## 2018-05-15 DIAGNOSIS — O09.93 SUPERVISION OF HIGH RISK PREGNANCY IN THIRD TRIMESTER: ICD-10-CM

## 2018-05-15 DIAGNOSIS — A59.9 TRICHOMONIASIS: ICD-10-CM

## 2018-05-15 DIAGNOSIS — O09.893 SUPERVISION OF OTHER HIGH RISK PREGNANCIES, THIRD TRIMESTER: ICD-10-CM

## 2018-05-15 DIAGNOSIS — D69.6 THROMBOCYTOPENIA AFFECTING PREGNANCY (HCC): ICD-10-CM

## 2018-05-15 DIAGNOSIS — O99.119 THROMBOCYTOPENIA AFFECTING PREGNANCY (HCC): ICD-10-CM

## 2018-05-15 DIAGNOSIS — R00.1 BRADYCARDIA: ICD-10-CM

## 2018-05-15 DIAGNOSIS — Z3A.37 37 WEEKS GESTATION OF PREGNANCY: ICD-10-CM

## 2018-05-15 LAB
NST ACOUSTIC STIMULATION: NORMAL
NST ACTION NECESSARY: NORMAL
NST ASSESSMENT: NORMAL
NST BASELINE: NORMAL
NST INDICATIONS: NORMAL
NST OTHER DATA: NORMAL
NST READ BY: NORMAL
NST RETURN: NORMAL
NST UTERINE ACTIVITY: NORMAL

## 2018-05-15 PROCEDURE — 59025 FETAL NON-STRESS TEST: CPT | Performed by: NURSE PRACTITIONER

## 2018-05-15 PROCEDURE — 87653 STREP B DNA AMP PROBE: CPT

## 2018-05-15 PROCEDURE — 90040 PR PRENATAL FOLLOW UP: CPT | Performed by: NURSE PRACTITIONER

## 2018-05-15 RX ORDER — METRONIDAZOLE 500 MG/1
2000 TABLET ORAL ONCE
Qty: 4 TAB | Refills: 0 | Status: SHIPPED | OUTPATIENT
Start: 2018-05-15 | End: 2018-05-15

## 2018-05-15 NOTE — PROGRESS NOTES
Pt here today for OB follow up  GBS to be done today  Reports +FM  WT: 200 lb  BP: 108/58  Pt states she has been missing her appt due to work.   Pt states was seen at [facility] in 12/2017 for back pain. States she was Dx with an UIT but has not been able to pick uo Rx at pharmacy due to not having money to pay tp pay for it  Pt states having a lot of N&V and dizziness. States no other complaints.  1 hr gtt, H/H, and T.Pallidum lab slip given today with instructions.  CAITLIN sheet given and explained today  Per Rae Perez too late to ask pt about BTL  Good # [phone]

## 2018-05-15 NOTE — PATIENT INSTRUCTIONS
P:  1.  Instructions given on FKCs.         2.  Labor precautions given.  Instructions given on where to go.  Pt receptive to education.         3.  D/w pt RCS policy.  RCS referral placed.       4.  Questions answered.         5.  Encouraged adequate water intake       6.  F/u 1wk       7.  GBS obtained.        8.  Rx sent for flagyl.         9.  28wk labs and CBC ordered.      10.  Handout given on trich.  Partner will need to be tx.  No sex.

## 2018-05-15 NOTE — LETTER
"Count Your Baby's Movements  Another step to a healthy delivery    Natalie Kaurspencer Schwab             Dept: [phone]    How Many Weeks Pregnant? 37W0D    Date to Begin Countin/15/2018              How to use this chart    One way for your physician to keep track of your baby's health is by knowing how often the baby moves (or \"kicks\") in your womb.  You can help your physician to do this by using this chart every day.    Every day, you should see how many hours it takes for your baby to move 10 times.  Start in the morning, as soon as you get up.    · First, write down the time your baby moves until you get to 10.  · Check off one box every time your baby moves until you get to 10.  · Write down the time you finished counting in the last column.  · Total how long it took to count up all 10 movements.  · Finally, fill in the box that shows how long this took.  After counting 10 movements, you no longer have to count any more that day.  The next morning, just start counting again as soon as you get up.    What should you call a \"movement\"?  It is hard to say, because it will feel different from one mother to another and from one pregnancy to the next.  The important thing is that you count the movements the same way throughout your pregnancy.  If you have more questions, you should ask your physician.    Count carefully every day!  SAMPLE:  Week 28    How many hours did it take to feel 10 movements?       Start  Time     1     2     3     4     5     6     7     8     9     10   Finish Time   Mon 8:20 ·  ·  ·  ·  ·  ·  ·  ·  ·  ·  11:40   Tue               Wed               Thu               Fri               Sat               Sun                 IMPORTANT: You should contact your physician if it takes more than two hours for you to feel 10 movements.  Each morning, write down the time and start to count the movements of your baby.  Keep track by checking off one box every time you feel one movement.  When " "you have felt 10 \"kicks\", write down the time you finished counting in the last column.  Then fill in the   box (over the check roni) for the number of hours it took.  Be sure to read the complete instructions on the previous page.            "

## 2018-05-15 NOTE — PROGRESS NOTES
S:  Pt is  at 37w0d here for routine OB follow up.  Reports questionable LOF x 1week.  Reports good FM.  Denies VB, RUCs, or vaginal DC.     O:  Please see above vitals.        FHTs: 130s, but decel heard to 105        Fundal ht: 36        Fetal position: vertex        SVE: Ft/50/-3          SSE: neg ferning, neg pooling, neg nitrazine.        Wet mount: +motile trich        NST obtained: baseline 130s +accels -decels mod variability.        [place]: Quiet    A:  IUP at 37w0d  Reactive NST cat I FHTs  Patient Active Problem List    Diagnosis Date Noted   • Trichomoniasis 05/15/2018   • Supervision of other high risk pregnancies, third trimester 05/15/2018   • Noncompliant pregnant patient in third trimester 05/15/2018   • Thrombocytopenia affecting pregnancy  2018   • History of  delivery affecting pregnancy 2018       P:  1.  Instructions given on FKCs.         2.  Labor precautions given.  Instructions given on where to go.  Pt receptive to education.         3.  D/w pt RCS policy.  RCS referral placed.       4.  Questions answered.         5.  Encouraged adequate water intake       6.  F/u 1wk       7.  GBS obtained.        8.  Rx sent for flagyl.         9.  28wk labs and CBC ordered.      10.  Handout given on trich.  Partner will need to be tx.  No sex.

## 2018-05-17 LAB — GP B STREP DNA SPEC QL NAA+PROBE: NEGATIVE

## 2018-05-22 ENCOUNTER — ROUTINE PRENATAL (OUTPATIENT)
Dept: OBGYN | Facility: CLINIC | Age: 23
End: 2018-05-22
Payer: MEDICAID

## 2018-05-22 VITALS — WEIGHT: 200 LBS | BODY MASS INDEX: 27.89 KG/M2 | SYSTOLIC BLOOD PRESSURE: 124 MMHG | DIASTOLIC BLOOD PRESSURE: 72 MMHG

## 2018-05-22 DIAGNOSIS — O99.119 THROMBOCYTOPENIA AFFECTING PREGNANCY (HCC): ICD-10-CM

## 2018-05-22 DIAGNOSIS — O09.893 SUPERVISION OF OTHER HIGH RISK PREGNANCIES, THIRD TRIMESTER: ICD-10-CM

## 2018-05-22 DIAGNOSIS — Z91.199 NONCOMPLIANT PREGNANT PATIENT IN THIRD TRIMESTER: ICD-10-CM

## 2018-05-22 DIAGNOSIS — D69.6 THROMBOCYTOPENIA AFFECTING PREGNANCY (HCC): ICD-10-CM

## 2018-05-22 DIAGNOSIS — O09.893 NONCOMPLIANT PREGNANT PATIENT IN THIRD TRIMESTER: ICD-10-CM

## 2018-05-22 DIAGNOSIS — A59.9 TRICHOMONIASIS: ICD-10-CM

## 2018-05-22 DIAGNOSIS — O34.219 HISTORY OF CESAREAN DELIVERY AFFECTING PREGNANCY: ICD-10-CM

## 2018-05-22 PROBLEM — O26.86 PUPPP (PRURITIC URTICARIAL PAPULES AND PLAQUES OF PREGNANCY): Status: ACTIVE | Noted: 2018-05-22

## 2018-05-22 PROCEDURE — 90040 PR PRENATAL FOLLOW UP: CPT | Performed by: NURSE PRACTITIONER

## 2018-05-22 NOTE — PROGRESS NOTES
SUBJECTIVE:  Pt is a 22 y.o.   at 38w0d  gestation. Presents today for follow-up prenatal care. Reports no issues at this time.  Reports good fetal movement. Denies cramping/contractions, bleeding or leaking of fluid. Denies dysuria, headaches, N/V, or other issues at this time. Generally feels well today. Reports not taking treatment for trich yet because does not have money for it, not sure if insurance will cover. Her partner has not taken treatment either.     OBJECTIVE:  - See prenatal vitals flow  -   Vitals:    18 0824   BP: 124/72   Weight: 90.7 kg (200 lb)                 ASSESSMENT:   - IUP at 38w0d    - S=D   -   Patient Active Problem List    Diagnosis Date Noted   • Trichomoniasis 05/15/2018   • Supervision of other high risk pregnancies, third trimester 05/15/2018   • Noncompliant pregnant patient in third trimester 05/15/2018   • History of  delivery affecting pregnancy 2018         PLAN:  '- Pt encouraged to take trich tx ASAP and partner as well  - Encouraged pt to do GCT after appt today as has not eaten yet   - S/sx pregnancy and labor warning signs vs general discomforts discussed  - Fetal movements and kick counts reviewed   - Adequate hydration reinforced  - Nutrition/exercise/vitamin education; continued PNV  - Plans unsure for breastfeeding:handout given and reviewed   - Plans unsure for contraception Pp: handout given and reviewed  - Anticipatory guidance given  - RTC PRN; C/s on

## 2018-05-22 NOTE — PROGRESS NOTES
Ob f/u. + fetal movement  good  No VB, LOF or contractions   C/O pt report no problems at this time   Phone number #  [phone]  Pharmacy verified with patient  WT=    200 lbs          IZ=[id number]  Labs not done yet. Pt will go as soon as she can   Patient is scheduled for C/S on 05/29/18 at 9:30 with Dr Horowitz.

## 2018-05-29 ENCOUNTER — HOSPITAL ENCOUNTER (OUTPATIENT)
Facility: MEDICAL CENTER | Age: 23
End: 2018-05-29
Attending: OBSTETRICS & GYNECOLOGY | Admitting: OBSTETRICS & GYNECOLOGY
Payer: COMMERCIAL

## 2018-05-29 VITALS
TEMPERATURE: 97.1 F | BODY MASS INDEX: 27.77 KG/M2 | HEIGHT: 72 IN | SYSTOLIC BLOOD PRESSURE: 113 MMHG | HEART RATE: 83 BPM | DIASTOLIC BLOOD PRESSURE: 67 MMHG | WEIGHT: 205 LBS

## 2018-05-29 PROCEDURE — 59025 FETAL NON-STRESS TEST: CPT | Performed by: OBSTETRICS & GYNECOLOGY

## 2018-05-29 NOTE — PROGRESS NOTES
1415 23yo, , edc6/5, 39 presents for repeat c/s. Pt denies LOF, vag bleeding. POS fm. EFM and [place] placed. VSS. Pt reports that she ate some yogurt at 1300. Dr. Summers notified. Will not do a c/s until 2100 unless she is laboring and needs one emergently. Dr. Horowitz notified. Will not do a c/so at 2100. Pt needs to reschedule for sometime this week. Dr. Mehta at bedside. Will try to reschedule.   1450 Pt rescheduled for 09 on . Pt given specific directions for the latest hour she can eat before c/s and when to check in. Also told to call one hour ahead for bed availability. Pt states understanding.  Discharge instructions given, pt states understanding. Reactive strip obtained. Pt discharged to home  in stable condition, ambulatory, with family.

## 2018-05-29 NOTE — PROGRESS NOTES
[facility] AND DELIV[facility] TRIAGE PROGRESS NOTE    PATIENT ID:  NAME:  Natalie Pop  MRN:               [id number]  YOB: 1995     22 y.o. female  at 39w0d.    Subjective: Pt presents to triage for scheduled repeat C/S, of note she had already been rescheduled from 0930 to 1600 today. However, she states she ate some yogurt prior to coming in. Patient rescheduled for 18 at 9:30 AM and given strict instructions to not eat or drink 8hrs prior to scheduled appointment.     Pregnancy complicated by: None    negative  For CTXS.   negative Feels pain   negative for LOF  negative for vaginal bleeding.   positive for fetal movement    ROS: Patient denies any fever chills, nausea, vomiting, headache, chest pain, shortness of breath, or dysuria or unusual swelling of hands or feet.     Objective:    Vitals:    18 1410 18 1418   BP: 113/67    Pulse: 83    Temp:  36.2 °C (97.1 °F)   TempSrc:  Temporal   Weight:  93 kg (205 lb)   Height:  1.829 m (6')     Temp (24hrs), Av.2 °C (97.1 °F), Min:36.2 °C (97.1 °F), Max:36.2 °C (97.1 °F)    General: No acute distress, resting comfortably in bed.  HEENT: normocephalic, nontraumatic, EOMI  Cardiovascular: Heart RRR with no murmurs, rubs or gallops. Distal Pulses 2+  Respiratory: symmetric chest expansion, lungs CTAB, with no wheezes, rales, rhonci  Abdomen: gravid, nontender  Musculoskeletal: HAZEL, no peripheral edema  Neuro: CN 2-12 grossly intact    [place]: Uterine Contractions: None  FHRM: Baseline 150,  + Accels , no decels, moderate variability    Assessment: 22 y.o. female  at 39w0d. Presented for scheduled repeat C/S but had to be rescheduled because she had eaten prior to coming in.  Not in active labor  Cat 1 FHT    Plan:   1. Patient is cleared to return home with family. Encouraged to see MD for increased painful uterine contractions @ 3-5, vaginal bleeding, loss of fluid, or other serious symptoms.      Discussed case with  Dr. Horowitz, [facility] Attending. Case was discussed and attending agreed with plan prior to discharge of patient.    Kya Mehta M.D.

## 2018-05-31 ENCOUNTER — HOSPITAL ENCOUNTER (INPATIENT)
Facility: MEDICAL CENTER | Age: 23
LOS: 3 days | End: 2018-06-03
Attending: OBSTETRICS & GYNECOLOGY | Admitting: OBSTETRICS & GYNECOLOGY
Payer: COMMERCIAL

## 2018-05-31 DIAGNOSIS — Z98.891 S/P C-SECTION: ICD-10-CM

## 2018-05-31 LAB
BASOPHILS # BLD AUTO: 0.1 % (ref 0–1.8)
BASOPHILS # BLD: 0.01 K/UL (ref 0–0.12)
EOSINOPHIL # BLD AUTO: 0.07 K/UL (ref 0–0.51)
EOSINOPHIL NFR BLD: 1 % (ref 0–6.9)
ERYTHROCYTE [DISTWIDTH] IN BLOOD BY AUTOMATED COUNT: 42.5 FL (ref 35.9–50)
HCT VFR BLD AUTO: 34.1 % (ref 37–47)
HGB BLD-MCNC: 11.6 G/DL (ref 12–16)
HOLDING TUBE BB 8507: NORMAL
IMM GRANULOCYTES # BLD AUTO: 0.03 K/UL (ref 0–0.11)
IMM GRANULOCYTES NFR BLD AUTO: 0.4 % (ref 0–0.9)
LYMPHOCYTES # BLD AUTO: 1.84 K/UL (ref 1–4.8)
LYMPHOCYTES NFR BLD: 26.2 % (ref 22–41)
MCH RBC QN AUTO: 30.1 PG (ref 27–33)
MCHC RBC AUTO-ENTMCNC: 34 G/DL (ref 33.6–35)
MCV RBC AUTO: 88.6 FL (ref 81.4–97.8)
MONOCYTES # BLD AUTO: 0.54 K/UL (ref 0–0.85)
MONOCYTES NFR BLD AUTO: 7.7 % (ref 0–13.4)
NEUTROPHILS # BLD AUTO: 4.52 K/UL (ref 2–7.15)
NEUTROPHILS NFR BLD: 64.6 % (ref 44–72)
NRBC # BLD AUTO: 0 K/UL
NRBC BLD-RTO: 0 /100 WBC
PLATELET # BLD AUTO: 208 K/UL (ref 164–446)
PMV BLD AUTO: 11.1 FL (ref 9–12.9)
RBC # BLD AUTO: 3.85 M/UL (ref 4.2–5.4)
WBC # BLD AUTO: 7 K/UL (ref 4.8–10.8)

## 2018-05-31 PROCEDURE — 700111 HCHG RX REV CODE 636 W/ 250 OVERRIDE (IP): Performed by: ANESTHESIOLOGY

## 2018-05-31 PROCEDURE — 700112 HCHG RX REV CODE 229

## 2018-05-31 PROCEDURE — 700111 HCHG RX REV CODE 636 W/ 250 OVERRIDE (IP): Performed by: STUDENT IN AN ORGANIZED HEALTH CARE EDUCATION/TRAINING PROGRAM

## 2018-05-31 PROCEDURE — 90715 TDAP VACCINE 7 YRS/> IM: CPT

## 2018-05-31 PROCEDURE — 304964 HCHG RECOVERY ROOM TIME 1HR: Performed by: OBSTETRICS & GYNECOLOGY

## 2018-05-31 PROCEDURE — 700102 HCHG RX REV CODE 250 W/ 637 OVERRIDE(OP): Performed by: ANESTHESIOLOGY

## 2018-05-31 PROCEDURE — 700111 HCHG RX REV CODE 636 W/ 250 OVERRIDE (IP)

## 2018-05-31 PROCEDURE — 36415 COLL VENOUS BLD VENIPUNCTURE: CPT

## 2018-05-31 PROCEDURE — 700105 HCHG RX REV CODE 258: Performed by: ANESTHESIOLOGY

## 2018-05-31 PROCEDURE — 59514 CESAREAN DELIVERY ONLY: CPT

## 2018-05-31 PROCEDURE — 304966 HCHG RECOVERY SVSC TIME ADDL 1/2 HR: Performed by: OBSTETRICS & GYNECOLOGY

## 2018-05-31 PROCEDURE — A9270 NON-COVERED ITEM OR SERVICE: HCPCS | Performed by: ANESTHESIOLOGY

## 2018-05-31 PROCEDURE — 700101 HCHG RX REV CODE 250

## 2018-05-31 PROCEDURE — 3E0234Z INTRODUCTION OF SERUM, TOXOID AND VACCINE INTO MUSCLE, PERCUTANEOUS APPROACH: ICD-10-PCS | Performed by: OBSTETRICS & GYNECOLOGY

## 2018-05-31 PROCEDURE — 700105 HCHG RX REV CODE 258: Performed by: OBSTETRICS & GYNECOLOGY

## 2018-05-31 PROCEDURE — 306828 HCHG ANES-TIME GENERAL: Performed by: OBSTETRICS & GYNECOLOGY

## 2018-05-31 PROCEDURE — 85025 COMPLETE CBC W/AUTO DIFF WBC: CPT

## 2018-05-31 PROCEDURE — 305385 HCHG SURGICAL SERVICES 1/4 HOUR: Performed by: OBSTETRICS & GYNECOLOGY

## 2018-05-31 PROCEDURE — 770002 HCHG ROOM/CARE - OB PRIVATE (112)

## 2018-05-31 PROCEDURE — 90471 IMMUNIZATION ADMIN: CPT

## 2018-05-31 RX ORDER — METOCLOPRAMIDE HYDROCHLORIDE 5 MG/ML
10 INJECTION INTRAMUSCULAR; INTRAVENOUS EVERY 6 HOURS PRN
Status: CANCELLED | OUTPATIENT
Start: 2018-05-31

## 2018-05-31 RX ORDER — OXYCODONE HYDROCHLORIDE AND ACETAMINOPHEN 5; 325 MG/1; MG/1
1 TABLET ORAL EVERY 4 HOURS PRN
Status: CANCELLED | OUTPATIENT
Start: 2018-05-31

## 2018-05-31 RX ORDER — ONDANSETRON 4 MG/1
4 TABLET, ORALLY DISINTEGRATING ORAL EVERY 6 HOURS PRN
Status: CANCELLED | OUTPATIENT
Start: 2018-05-31

## 2018-05-31 RX ORDER — SIMETHICONE 80 MG
80 TABLET,CHEWABLE ORAL 4 TIMES DAILY PRN
Status: DISCONTINUED | OUTPATIENT
Start: 2018-05-31 | End: 2018-06-03 | Stop reason: HOSPADM

## 2018-05-31 RX ORDER — MAG HYDROX/ALUMINUM HYD/SIMETH 400-400-40
1 SUSPENSION, ORAL (FINAL DOSE FORM) ORAL
Status: CANCELLED | OUTPATIENT
Start: 2018-05-31

## 2018-05-31 RX ORDER — METOCLOPRAMIDE HYDROCHLORIDE 5 MG/ML
10 INJECTION INTRAMUSCULAR; INTRAVENOUS EVERY 6 HOURS PRN
Status: DISCONTINUED | OUTPATIENT
Start: 2018-05-31 | End: 2018-06-01

## 2018-05-31 RX ORDER — KETOROLAC TROMETHAMINE 30 MG/ML
30 INJECTION, SOLUTION INTRAMUSCULAR; INTRAVENOUS EVERY 6 HOURS
Status: CANCELLED | OUTPATIENT
Start: 2018-05-31 | End: 2018-06-01

## 2018-05-31 RX ORDER — METHYLERGONOVINE MALEATE 0.2 MG/ML
0.2 INJECTION INTRAVENOUS
Status: DISCONTINUED | OUTPATIENT
Start: 2018-05-31 | End: 2018-06-03 | Stop reason: HOSPADM

## 2018-05-31 RX ORDER — MORPHINE SULFATE 4 MG/ML
4 INJECTION, SOLUTION INTRAMUSCULAR; INTRAVENOUS
Status: CANCELLED | OUTPATIENT
Start: 2018-05-31

## 2018-05-31 RX ORDER — SODIUM CHLORIDE, SODIUM LACTATE, POTASSIUM CHLORIDE, CALCIUM CHLORIDE 600; 310; 30; 20 MG/100ML; MG/100ML; MG/100ML; MG/100ML
INJECTION, SOLUTION INTRAVENOUS PRN
Status: CANCELLED | OUTPATIENT
Start: 2018-05-31

## 2018-05-31 RX ORDER — OXYCODONE AND ACETAMINOPHEN 10; 325 MG/1; MG/1
1 TABLET ORAL EVERY 4 HOURS PRN
Status: DISCONTINUED | OUTPATIENT
Start: 2018-05-31 | End: 2018-06-01

## 2018-05-31 RX ORDER — METHYLERGONOVINE MALEATE 0.2 MG/ML
0.2 INJECTION INTRAVENOUS
Status: CANCELLED | OUTPATIENT
Start: 2018-05-31

## 2018-05-31 RX ORDER — HYDROMORPHONE HYDROCHLORIDE 2 MG/ML
0.2 INJECTION, SOLUTION INTRAMUSCULAR; INTRAVENOUS; SUBCUTANEOUS
Status: DISCONTINUED | OUTPATIENT
Start: 2018-05-31 | End: 2018-06-01

## 2018-05-31 RX ORDER — MISOPROSTOL 200 UG/1
600 TABLET ORAL
Status: CANCELLED | OUTPATIENT
Start: 2018-05-31

## 2018-05-31 RX ORDER — OXYCODONE HYDROCHLORIDE AND ACETAMINOPHEN 5; 325 MG/1; MG/1
1 TABLET ORAL EVERY 4 HOURS PRN
Status: DISCONTINUED | OUTPATIENT
Start: 2018-05-31 | End: 2018-06-01

## 2018-05-31 RX ORDER — OXYCODONE HYDROCHLORIDE AND ACETAMINOPHEN 5; 325 MG/1; MG/1
1-2 TABLET ORAL EVERY 4 HOURS PRN
Status: DISCONTINUED | OUTPATIENT
Start: 2018-05-31 | End: 2018-05-31

## 2018-05-31 RX ORDER — METOCLOPRAMIDE HYDROCHLORIDE 5 MG/ML
10 INJECTION INTRAMUSCULAR; INTRAVENOUS ONCE
Status: COMPLETED | OUTPATIENT
Start: 2018-05-31 | End: 2018-05-31

## 2018-05-31 RX ORDER — HYDROCODONE BITARTRATE AND ACETAMINOPHEN 10; 325 MG/1; MG/1
1 TABLET ORAL EVERY 4 HOURS PRN
Status: CANCELLED | OUTPATIENT
Start: 2018-05-31

## 2018-05-31 RX ORDER — CITRIC ACID/SODIUM CITRATE 334-500MG
30 SOLUTION, ORAL ORAL ONCE
Status: COMPLETED | OUTPATIENT
Start: 2018-05-31 | End: 2018-05-31

## 2018-05-31 RX ORDER — DIPHENHYDRAMINE HYDROCHLORIDE 50 MG/ML
25 INJECTION INTRAMUSCULAR; INTRAVENOUS EVERY 6 HOURS PRN
Status: DISCONTINUED | OUTPATIENT
Start: 2018-05-31 | End: 2018-06-01

## 2018-05-31 RX ORDER — OXYTOCIN 10 [USP'U]/ML
10 INJECTION, SOLUTION INTRAMUSCULAR; INTRAVENOUS ONCE
Status: DISCONTINUED | OUTPATIENT
Start: 2018-05-31 | End: 2018-05-31 | Stop reason: HOSPADM

## 2018-05-31 RX ORDER — ONDANSETRON 2 MG/ML
4 INJECTION INTRAMUSCULAR; INTRAVENOUS EVERY 6 HOURS PRN
Status: CANCELLED | OUTPATIENT
Start: 2018-05-31

## 2018-05-31 RX ORDER — SIMETHICONE 80 MG
80 TABLET,CHEWABLE ORAL 4 TIMES DAILY PRN
Status: CANCELLED | OUTPATIENT
Start: 2018-05-31

## 2018-05-31 RX ORDER — MISOPROSTOL 200 UG/1
800 TABLET ORAL
Status: DISCONTINUED | OUTPATIENT
Start: 2018-05-31 | End: 2018-05-31 | Stop reason: HOSPADM

## 2018-05-31 RX ORDER — ACETAMINOPHEN 500 MG
1000 TABLET ORAL ONCE
Status: COMPLETED | OUTPATIENT
Start: 2018-05-31 | End: 2018-05-31

## 2018-05-31 RX ORDER — KETOROLAC TROMETHAMINE 30 MG/ML
30 INJECTION, SOLUTION INTRAMUSCULAR; INTRAVENOUS EVERY 6 HOURS
Status: DISPENSED | OUTPATIENT
Start: 2018-05-31 | End: 2018-06-01

## 2018-05-31 RX ORDER — SODIUM CHLORIDE, SODIUM LACTATE, POTASSIUM CHLORIDE, CALCIUM CHLORIDE 600; 310; 30; 20 MG/100ML; MG/100ML; MG/100ML; MG/100ML
INJECTION, SOLUTION INTRAVENOUS CONTINUOUS
Status: DISCONTINUED | OUTPATIENT
Start: 2018-05-31 | End: 2018-05-31 | Stop reason: HOSPADM

## 2018-05-31 RX ORDER — VITAMIN A ACETATE, BETA CAROTENE, ASCORBIC ACID, CHOLECALCIFEROL, .ALPHA.-TOCOPHEROL ACETATE, DL-, THIAMINE MONONITRATE, RIBOFLAVIN, NIACINAMIDE, PYRIDOXINE HYDROCHLORIDE, FOLIC ACID, CYANOCOBALAMIN, CALCIUM CARBONATE, FERROUS FUMARATE, ZINC OXIDE, CUPRIC OXIDE 3080; 12; 120; 400; 1; 1.84; 3; 20; 22; 920; 25; 200; 27; 10; 2 [IU]/1; UG/1; MG/1; [IU]/1; MG/1; MG/1; MG/1; MG/1; MG/1; [IU]/1; MG/1; MG/1; MG/1; MG/1; MG/1
1 TABLET, FILM COATED ORAL EVERY MORNING
Status: CANCELLED | OUTPATIENT
Start: 2018-05-31

## 2018-05-31 RX ORDER — ONDANSETRON 4 MG/1
4 TABLET, ORALLY DISINTEGRATING ORAL EVERY 6 HOURS PRN
Status: DISCONTINUED | OUTPATIENT
Start: 2018-05-31 | End: 2018-06-03 | Stop reason: HOSPADM

## 2018-05-31 RX ORDER — DIPHENHYDRAMINE HYDROCHLORIDE 50 MG/ML
12.5 INJECTION INTRAMUSCULAR; INTRAVENOUS EVERY 6 HOURS PRN
Status: DISCONTINUED | OUTPATIENT
Start: 2018-05-31 | End: 2018-06-01

## 2018-05-31 RX ORDER — NALOXONE HYDROCHLORIDE 0.4 MG/ML
0.1 INJECTION, SOLUTION INTRAMUSCULAR; INTRAVENOUS; SUBCUTANEOUS PRN
Status: DISCONTINUED | OUTPATIENT
Start: 2018-05-31 | End: 2018-06-01

## 2018-05-31 RX ORDER — SODIUM CHLORIDE, SODIUM LACTATE, POTASSIUM CHLORIDE, CALCIUM CHLORIDE 600; 310; 30; 20 MG/100ML; MG/100ML; MG/100ML; MG/100ML
INJECTION, SOLUTION INTRAVENOUS PRN
Status: DISCONTINUED | OUTPATIENT
Start: 2018-05-31 | End: 2018-06-03 | Stop reason: HOSPADM

## 2018-05-31 RX ORDER — ACETAMINOPHEN 325 MG/1
325 TABLET ORAL EVERY 4 HOURS PRN
Status: CANCELLED | OUTPATIENT
Start: 2018-05-31

## 2018-05-31 RX ORDER — DOCUSATE SODIUM 100 MG/1
100 CAPSULE, LIQUID FILLED ORAL 2 TIMES DAILY PRN
Status: DISCONTINUED | OUTPATIENT
Start: 2018-05-31 | End: 2018-06-03 | Stop reason: HOSPADM

## 2018-05-31 RX ORDER — SODIUM CHLORIDE, SODIUM GLUCONATE, SODIUM ACETATE, POTASSIUM CHLORIDE AND MAGNESIUM CHLORIDE 526; 502; 368; 37; 30 MG/100ML; MG/100ML; MG/100ML; MG/100ML; MG/100ML
1500 INJECTION, SOLUTION INTRAVENOUS ONCE
Status: COMPLETED | OUTPATIENT
Start: 2018-05-31 | End: 2018-05-31

## 2018-05-31 RX ORDER — BISACODYL 10 MG
10 SUPPOSITORY, RECTAL RECTAL PRN
Status: CANCELLED | OUTPATIENT
Start: 2018-05-31

## 2018-05-31 RX ORDER — DOCUSATE SODIUM 100 MG/1
100 CAPSULE, LIQUID FILLED ORAL 2 TIMES DAILY PRN
Status: CANCELLED | OUTPATIENT
Start: 2018-05-31

## 2018-05-31 RX ORDER — ONDANSETRON 2 MG/ML
4 INJECTION INTRAMUSCULAR; INTRAVENOUS EVERY 6 HOURS PRN
Status: DISCONTINUED | OUTPATIENT
Start: 2018-05-31 | End: 2018-06-03 | Stop reason: HOSPADM

## 2018-05-31 RX ORDER — MISOPROSTOL 200 UG/1
600 TABLET ORAL
Status: DISCONTINUED | OUTPATIENT
Start: 2018-05-31 | End: 2018-06-03 | Stop reason: HOSPADM

## 2018-05-31 RX ORDER — METHYLERGONOVINE MALEATE 0.2 MG/ML
0.2 INJECTION INTRAVENOUS
Status: DISCONTINUED | OUTPATIENT
Start: 2018-05-31 | End: 2018-05-31 | Stop reason: HOSPADM

## 2018-05-31 RX ADMIN — KETOROLAC TROMETHAMINE 30 MG: 30 INJECTION, SOLUTION INTRAMUSCULAR at 18:20

## 2018-05-31 RX ADMIN — TETANUS TOXOID, REDUCED DIPHTHERIA TOXOID AND ACELLULAR PERTUSSIS VACCINE, ADSORBED 0.5 ML: 5; 2.5; 8; 8; 2.5 SUSPENSION INTRAMUSCULAR at 21:54

## 2018-05-31 RX ADMIN — SODIUM CHLORIDE, SODIUM GLUCONATE, SODIUM ACETATE, POTASSIUM CHLORIDE AND MAGNESIUM CHLORIDE 1500 ML: 526; 502; 368; 37; 30 INJECTION, SOLUTION INTRAVENOUS at 08:20

## 2018-05-31 RX ADMIN — SODIUM CHLORIDE, POTASSIUM CHLORIDE, SODIUM LACTATE AND CALCIUM CHLORIDE: 600; 310; 30; 20 INJECTION, SOLUTION INTRAVENOUS at 23:48

## 2018-05-31 RX ADMIN — OXYCODONE HYDROCHLORIDE AND ACETAMINOPHEN 1 TABLET: 5; 325 TABLET ORAL at 13:05

## 2018-05-31 RX ADMIN — Medication 125 ML/HR: at 12:50

## 2018-05-31 RX ADMIN — ACETAMINOPHEN 1000 MG: 500 TABLET ORAL at 09:31

## 2018-05-31 ASSESSMENT — LIFESTYLE VARIABLES
ALCOHOL_USE: NO
EVER_SMOKED: YES

## 2018-05-31 ASSESSMENT — PAIN SCALES - GENERAL
PAINLEVEL_OUTOF10: 0
PAINLEVEL_OUTOF10: 2
PAINLEVEL_OUTOF10: 0
PAINLEVEL_OUTOF10: 2
PAINLEVEL_OUTOF10: 0

## 2018-05-31 ASSESSMENT — COPD QUESTIONNAIRES
DO YOU EVER COUGH UP ANY MUCUS OR PHLEGM?: NO/ONLY WITH OCCASIONAL COLDS OR INFECTIONS
DURING THE PAST 4 WEEKS HOW MUCH DID YOU FEEL SHORT OF BREATH: NONE/LITTLE OF THE TIME
IN THE PAST 12 MONTHS DO YOU DO LESS THAN YOU USED TO BECAUSE OF YOUR BREATHING PROBLEMS: DISAGREE/UNSURE
COPD SCREENING SCORE: 0
HAVE YOU SMOKED AT LEAST 100 CIGARETTES IN YOUR ENTIRE LIFE: NO/DON'T KNOW

## 2018-05-31 ASSESSMENT — PATIENT HEALTH QUESTIONNAIRE - PHQ9
1. LITTLE INTEREST OR PLEASURE IN DOING THINGS: NOT AT ALL
SUM OF ALL RESPONSES TO PHQ9 QUESTIONS 1 AND 2: 0
2. FEELING DOWN, DEPRESSED, IRRITABLE, OR HOPELESS: NOT AT ALL

## 2018-05-31 NOTE — H&P
History and Physical      Natalie Pop is a 22 y.o. year old female  at 39w2d who presents for scheduled repeat C/S.  Denies HA, SOB, chest pain, nausea, vomiting, new swelling in hands or feet, hematuria, dysuria.  Pregnancy complicated by limited prenatal care, only had three total visits.    Subjective:   negative  For CTXS.   negative Feels pain   negative for LOF  negative for vaginal bleeding.   positive for fetal movement    ROS: Pertinent items are noted in HPI.    Past Medical History:   Diagnosis Date   • Inflammatory disease of ovary, fallopian tube, pelvic cellular tissue, and peritoneum      Past Surgical History:   Procedure Laterality Date   • REPEAT C SECTION  2015    Performed by Erich Beltran M.D. at LABOR AND DELIVERY   • PRIMARY C SECTION  2012   • GYN SURGERY  2012         OB History    Para Term  AB Living   4 2 2   1 2   SAB TAB Ectopic Molar Multiple Live Births     1       2      # Outcome Date GA Lbr Hussain/2nd Weight Sex Delivery Anes PTL Lv   4 Current            3 Term 04/13/15 40w0d  3.033 kg (6 lb 11 oz) F CS-LTranv Spinal N LONG      Birth Comments: C/Section for repeat    2 Term 12 40w0d  3.487 kg (7 lb 11 oz) M CS-LTranv Spinal N LONG      Birth Comments: baby's heart rate was going down   1 TAB  4w0d               Social History     Social History   • Marital status: Legally      Spouse name: N/A   • Number of children: N/A   • Years of education: N/A     Occupational History   • Not on file.     Social History Main Topics   • Smoking status: Former Smoker     Packs/day: 1.00     Years: 10.00     Types: Cigarettes     Start date: 2015     Quit date: 1/15/2018   • Smokeless tobacco: Never Used   • Alcohol use No   • Drug use: No   • Sexual activity: Yes     Partners: Male      Comment: Unplanned pregnancy     Other Topics Concern   • Not on file     Social History Narrative   • No narrative on file  "    Allergies: Pcn [penicillins]    Current Facility-Administered Medications:   •  Sod Citrate-Citric Acid (BICITRA) 500-334 MG/5ML solution 30 mL, 30 mL, Oral, Once, Chuck Varela M.D.  •  famotidine (PEPCID) injection 20 mg, 20 mg, Intravenous, Once, Chuck Varela M.D.  •  metoclopramide (REGLAN) injection 10 mg, 10 mg, Intravenous, Once, Chuck Varela M.D.  •  lactated ringers (LR) infusion, , Intravenous, Continuous, Kya Mehta M.D.  •  LR infusion, , Intravenous, Continuous, Kya Mehta M.D.  •  oxytocin (PITOCIN) injection 10 Units, 10 Units, Intramuscular, Once, Kya Mehta M.D.  •  miSOPROStol (CYTOTEC) tablet 800 mcg, 800 mcg, Rectal, Once PRN, Kya Mehta M.D.  •  methylergonovine (METHERGINE) injection 0.2 mg, 0.2 mg, Intramuscular, Once PRN, Kya Mehta M.D.    Prenatal care with [facility] starting at 21w (patient only had 3 prenatal visits) with the following problems:  Patient Active Problem List    Diagnosis Date Noted   • PUPPP (pruritic urticarial papules and plaques of pregnancy) 2018   • Trichomoniasis 05/15/2018   • Supervision of other high risk pregnancies, third trimester 05/15/2018   • History of  delivery affecting pregnancy 2018               Objective:      Height 1.803 m (5' 11\"), weight 90.7 kg (200 lb).    General:   no acute distress   Skin:   normal   HEENT:  EOMI   Lungs:   CTA bilateral   Heart:   S1, S2 normal, no murmur, click, rub or gallop, regular rate and rhythm, brisk carotid upstroke without bruits, peripheral pulses very brisk, chest is clear without rales or wheezing, no pedal edema, no JVD, no hepatosplenomegaly   Abdomen:   gravid, NT   EFW:  3500g   Pelvis:  adequate with gynecoid pelvis   FHT:  130 BPM   Uterine Size: S=D   Presentations: Cephalic    Cervix:  Exam deferred    Dilation:     Effacement:     Station:      Consistency:     Position:      Lab Review  Lab:   Blood type: A     Recent Results (from the " past 5880 hour(s))   CBC WITH DIFFERENTIAL    Collection Time: 12/29/17  8:30 PM   Result Value Ref Range    WBC 4.8 4.8 - 10.8 K/uL    RBC 3.62 (L) 4.20 - 5.40 M/uL    Hemoglobin 11.6 (L) 12.0 - 16.0 g/dL    Hematocrit 34.1 (L) 37.0 - 47.0 %    MCV 94.2 81.4 - 97.8 fL    MCH 32.0 27.0 - 33.0 pg    MCHC 34.0 33.6 - 35.0 g/dL    RDW 46.5 35.9 - 50.0 fL    Platelet Count 174 164 - 446 K/uL    MPV 11.2 9.0 - 12.9 fL    Neutrophils-Polys 59.90 44.00 - 72.00 %    Lymphocytes 29.60 22.00 - 41.00 %    Monocytes 8.60 0.00 - 13.40 %    Eosinophils 1.30 0.00 - 6.90 %    Basophils 0.40 0.00 - 1.80 %    Immature Granulocytes 0.20 0.00 - 0.90 %    Nucleated RBC 0.00 /100 WBC    Neutrophils (Absolute) 2.85 2.00 - 7.15 K/uL    Lymphs (Absolute) 1.41 1.00 - 4.80 K/uL    Monos (Absolute) 0.41 0.00 - 0.85 K/uL    Eos (Absolute) 0.06 0.00 - 0.51 K/uL    Baso (Absolute) 0.02 0.00 - 0.12 K/uL    Immature Granulocytes (abs) 0.01 0.00 - 0.11 K/uL    NRBC (Absolute) 0.00 K/uL   COMP METABOLIC PANEL    Collection Time: 12/29/17  8:30 PM   Result Value Ref Range    Sodium 136 135 - 145 mmol/L    Potassium 3.9 3.6 - 5.5 mmol/L    Chloride 106 96 - 112 mmol/L    Co2 25 20 - 33 mmol/L    Anion Gap 5.0 0.0 - 11.9    Glucose 76 65 - 99 mg/dL    Bun 12 8 - 22 mg/dL    Creatinine 0.61 0.50 - 1.40 mg/dL    Calcium 9.4 8.5 - 10.5 mg/dL    AST(SGOT) 10 (L) 12 - 45 U/L    ALT(SGPT) 7 2 - 50 U/L    Alkaline Phosphatase 37 30 - 99 U/L    Total Bilirubin 0.5 0.1 - 1.5 mg/dL    Albumin 3.6 3.2 - 4.9 g/dL    Total Protein 6.2 6.0 - 8.2 g/dL    Globulin 2.6 1.9 - 3.5 g/dL    A-G Ratio 1.4 g/dL   LIPASE    Collection Time: 12/29/17  8:30 PM   Result Value Ref Range    Lipase 33 11 - 82 U/L   URINALYSIS,CULTURE IF INDICATED    Collection Time: 12/29/17  8:30 PM   Result Value Ref Range    Color Yellow     Character Cloudy (A)     Specific Gravity 1.023 <1.035    Ph 6.0 5.0 - 8.0    Glucose Negative Negative mg/dL    Ketones Negative Negative mg/dL    Protein  Negative Negative mg/dL    Bilirubin Negative Negative    Urobilinogen, Urine 0.2 Negative    Nitrite Negative Negative    Leukocyte Esterase Trace (A) Negative    Occult Blood Negative Negative    Micro Urine Req Microscopic     Culture Indicated Yes UA Culture   BETA-HCG QUALITATIVE SERUM    Collection Time: 12/29/17  8:30 PM   Result Value Ref Range    Beta-Hcg Qualitative Serum Positive (A) Negative   URINE CULTURE(NEW)    Collection Time: 12/29/17  8:30 PM   Result Value Ref Range    Significant Indicator NEG     Source UR     Site      Urine Culture No growth at 48 hours    URINE MICROSCOPIC (W/UA)    Collection Time: 12/29/17  8:30 PM   Result Value Ref Range    WBC 5-10 (A) /hpf    RBC 0-2 /hpf    Bacteria Moderate (A) None /hpf    Epithelial Cells Many (A) /hpf    Ca Oxalate Crystal Few /hpf    Hyaline Cast 0-2 /lpf   ESTIMATED GFR    Collection Time: 12/29/17  8:30 PM   Result Value Ref Range    GFR If African American >60 >60 mL/min/1.73 m 2    GFR If Non African American >60 >60 mL/min/1.73 m 2   BETA-HCG QUANTITATIVE SERUM    Collection Time: 12/29/17  8:30 PM   Result Value Ref Range    Bhcg 92876.0 (H) 0.0 - 5.0 mIU/mL   WET PREP    Collection Time: 12/29/17 10:30 PM   Result Value Ref Range    Significant Indicator NEG     Source GEN     Site CERVICAL     Wet Prep For Parasites       Rare WBC's seen.  No motile Trichomonas seen.  No clue cells seen.  12/29/2017  23:06     CHLAMYDIA/GC PCR URINE OR SWAB    Collection Time: 12/29/17 10:30 PM   Result Value Ref Range    Source Vaginal     C. trachomatis by PCR Negative Negative    N. gonorrhoeae by PCR Negative Negative   POCT Urinalysis    Collection Time: 01/25/18  9:25 AM   Result Value Ref Range    POC Color  Negative    POC Appearance  Negative    POC Leukocyte Esterase moderate Negative    POC Nitrites neg Negative    POC Urobiligen  Negative (0.2) mg/dL    POC Protein trace Negative mg/dL    POC Urine PH 6.5 5.0 - 8.0    POC Blood neg Negative     POC Specific Gravity 1.010 <1.005 - >1.030    POC Ketones large Negative mg/dL    POC Bilirubin  Negative mg/dL    POC Glucose neg Negative mg/dL   AFP TETRA    Collection Time: 01/25/18 10:42 AM   Result Value Ref Range    AFP Value -Eia 36 ng/mL    AFP MOM Value 0.61     Hcg Value 33506 IU/L    Hcg Mom 1.55     Ue3 Value 2.38 ng/mL    Ue3 Mom 0.99     Interpretation Normal     M[age] NIKHIL 24[age]8 yr    Gestational Age Based On US     Gestational Age 21.29 weeks    Insulin Dependent Diabetes No     Race White     Multiple Pregnancy One     Shelly Value -Eia 122 pg/mL    Shelly Mom Value 0.66     Maternal Weight 190 lbs    Err Maternal Scrn AFP See Note    HIV AG/AB COMBO ASSAY SCREENING    Collection Time: 01/25/18 10:42 AM   Result Value Ref Range    HIV Ag/Ab Combo Assay Non Reactive Non Reactive   URINE CULTURE(NEW)    Collection Time: 01/25/18 10:42 AM   Result Value Ref Range    Significant Indicator NEG     Source UR     Site      Urine Culture No growth at 48 hours    THINPREP PAP W/HPV AND CTNG    Collection Time: 01/25/18 10:50 AM   Result Value Ref Range    Cytology Reg See Path Report     Source Cervical     Source Cervical     HPV Genotype 16 Negative Negative    HPV Genotype 18 Negative Negative    HPV Other High Risk Genotypes Negative Negative    C. trachomatis by PCR Negative Negative    N. gonorrhoeae by PCR Negative Negative   OP PRENATAL PANEL-BLOOD BANK    Collection Time: 01/25/18 10:57 AM   Result Value Ref Range    ABO Grouping Only A     Rh Grouping Only POS     Antibody Screen Scrn NEG    PREG CNTR PRENATAL PN    Collection Time: 01/25/18 11:01 AM   Result Value Ref Range    WBC 5.2 4.8 - 10.8 K/uL    RBC 3.75 (L) 4.20 - 5.40 M/uL    Hemoglobin 11.6 (L) 12.0 - 16.0 g/dL    Hematocrit 35.2 (L) 37.0 - 47.0 %    MCV 93.9 81.4 - 97.8 fL    MCH 30.9 27.0 - 33.0 pg    MCHC 33.0 (L) 33.6 - 35.0 g/dL    RDW 44.8 35.9 - 50.0 fL    Platelet Count 156 (L) 164 - 446 K/uL    MPV 12.0 9.0 - 12.9 fL     Neutrophils-Polys 70.20 44.00 - 72.00 %    Lymphocytes 21.60 (L) 22.00 - 41.00 %    Monocytes 6.60 0.00 - 13.40 %    Eosinophils 1.00 0.00 - 6.90 %    Basophils 0.20 0.00 - 1.80 %    Immature Granulocytes 0.40 0.00 - 0.90 %    Nucleated RBC 0.00 /100 WBC    Neutrophils (Absolute) 3.62 2.00 - 7.15 K/uL    Lymphs (Absolute) 1.11 1.00 - 4.80 K/uL    Monos (Absolute) 0.34 0.00 - 0.85 K/uL    Eos (Absolute) 0.05 0.00 - 0.51 K/uL    Baso (Absolute) 0.01 0.00 - 0.12 K/uL    Immature Granulocytes (abs) 0.02 0.00 - 0.11 K/uL    NRBC (Absolute) 0.00 K/uL    Color Yellow     Character Clear     Specific Gravity 1.023 <1.035    Ph 6.0 5.0 - 8.0    Glucose Negative Negative mg/dL    Ketones 40 (A) Negative mg/dL    Protein Negative Negative mg/dL    Bilirubin Negative Negative    Urobilinogen, Urine 0.2 Negative    Nitrite Negative Negative    Leukocyte Esterase Small (A) Negative    Occult Blood Negative Negative    Micro Urine Req Microscopic     Culture Indicated Yes UA Culture    Rubella IgG Antibody 188.50 IU/mL    Syphilis, Treponemal Qual Non Reactive Non Reactive    Hepatitis B Surface Antigen Negative Negative   URINE MICROSCOPIC (W/UA)    Collection Time: 01/25/18 11:01 AM   Result Value Ref Range    WBC 2-5 /hpf    RBC 2-5 (A) /hpf    Bacteria Negative None /hpf    Epithelial Cells Few /hpf    Epithelial Cells Renal Few /hpf    Hyaline Cast 0-2 /lpf   POC UA    Collection Time: 04/10/18  6:47 PM   Result Value Ref Range    POC Color Yellow     POC Appearance Cloudy (A)     POC Glucose Negative Negative mg/dL    POC Ketones Negative Negative mg/dL    POC Specific Gravity 1.015 1.005 - 1.030    POC Blood Negative Negative    POC Urine PH 7.0 5.0 - 8.0    POC Protein Negative Negative mg/dL    POC Nitrites Negative Negative    POC Leukocyte Esterase Moderate (A) Negative   URINE CULTURE(NEW)    Collection Time: 04/10/18  8:45 PM   Result Value Ref Range    Significant Indicator NEG     Source UR     Site URINE, CLEAN  CATCH     Urine Culture Mixed skin shade ,000 cfu/mL    URINALYSIS    Collection Time: 04/10/18  8:45 PM   Result Value Ref Range    Color Yellow     Character Cloudy (A)     Specific Gravity 1.014 <1.035    Ph 7.0 5.0 - 8.0    Glucose Negative Negative mg/dL    Ketones Negative Negative mg/dL    Protein Negative Negative mg/dL    Bilirubin Negative Negative    Urobilinogen, Urine 0.2 Negative    Nitrite Negative Negative    Leukocyte Esterase Large (A) Negative    Occult Blood Negative Negative    Micro Urine Req Microscopic    URINE MICROSCOPIC (W/UA)    Collection Time: 04/10/18  8:45 PM   Result Value Ref Range    WBC 20-50 (A) /hpf    RBC 2-5 (A) /hpf    Bacteria Many (A) None /hpf    Epithelial Cells Few /hpf    Ca Oxalate Crystal Moderate /hpf    Hyaline Cast 0-2 /lpf   CBC WITH DIFFERENTIAL    Collection Time: 04/10/18  9:10 PM   Result Value Ref Range    WBC 8.0 4.8 - 10.8 K/uL    RBC 3.77 (L) 4.20 - 5.40 M/uL    Hemoglobin 11.7 (L) 12.0 - 16.0 g/dL    Hematocrit 35.4 (L) 37.0 - 47.0 %    MCV 93.9 81.4 - 97.8 fL    MCH 31.0 27.0 - 33.0 pg    MCHC 33.1 (L) 33.6 - 35.0 g/dL    RDW 45.6 35.9 - 50.0 fL    Platelet Count 184 164 - 446 K/uL    MPV 11.5 9.0 - 12.9 fL    Neutrophils-Polys 72.20 (H) 44.00 - 72.00 %    Lymphocytes 19.90 (L) 22.00 - 41.00 %    Monocytes 6.20 0.00 - 13.40 %    Eosinophils 0.90 0.00 - 6.90 %    Basophils 0.40 0.00 - 1.80 %    Immature Granulocytes 0.40 0.00 - 0.90 %    Nucleated RBC 0.00 /100 WBC    Neutrophils (Absolute) 5.75 2.00 - 7.15 K/uL    Lymphs (Absolute) 1.58 1.00 - 4.80 K/uL    Monos (Absolute) 0.49 0.00 - 0.85 K/uL    Eos (Absolute) 0.07 0.00 - 0.51 K/uL    Baso (Absolute) 0.03 0.00 - 0.12 K/uL    Immature Granulocytes (abs) 0.03 0.00 - 0.11 K/uL    NRBC (Absolute) 0.00 K/uL   POCT Fetal Nonstress Test    Collection Time: 05/15/18 10:10 AM   Result Value Ref Range    NST Indications Decel     NST Baseline 130s     NST Uterine Activity Quiet     NST Acoustic Stimulation  None     NST Assessment       Reactive NST cat I FHTs +accels -decels mod variability    NST Action Necessary      NST Other Data RCS referral placed     NST Return 1wk CHANTALE     NST Read By [facility]    GRP B STREP, BY PCR (CHASE BROTH)    Collection Time: 05/15/18 10:25 AM   Result Value Ref Range    Strep Gp B DNA PCR Negative Negative        Assessment:   Natalie BabcockIsauraZaheer at 39w2d. Presents for scheduled repeat .  Labor status: Not in labor.  Obstetrical history significant for   Patient Active Problem List    Diagnosis Date Noted   • PUPPP (pruritic urticarial papules and plaques of pregnancy) 2018   • Trichomoniasis 05/15/2018   • Supervision of other high risk pregnancies, third trimester 05/15/2018   • History of  delivery affecting pregnancy 2018   .      Plan:     Admit to L&D for scheduled repeat   GBS negative  Cat 1 FHT  Does not desire BTL  Risks and benefits of procedure were discussed with patient by Dr. Beltran, C attending.

## 2018-05-31 NOTE — PROGRESS NOTES
0737 Pt arrived to L&D for scheduled Repeat  section. Pt placed on EFM and TOCO, IV started, labs drawn and sent, admission profile completed, POC discussed, all questions answered.  0810 Devin Rosario at bedside to consent pt. All questions answered, pt verbalized understanding.   0842 Dr. Varela at bedside to consent pt. All questions answered, pt verbalized understanding.   1110 pt out of preop to OR 2.  1113pt in OR 1  1141 surgery was converted from spinal to general. pt consented.   1145 viable baby girl was born via Dr. Beltran, APGARs  8/9.  1223 pt out of OR to PACU bed 3 in stable condition. Mother and infant bonding. Fundus firm and light.   1325 pt transferred to PPU with baby in arms, both in stable condition. Bedside report given to PPU RN, assumed care. POC discussed.

## 2018-05-31 NOTE — OP REPORT
DATE OF SERVICE:  2018    PREOPERATIVE DIAGNOSES:  Intrauterine pregnancy at 39-2/7 weeks, history of   previous  section x2, here for elective repeat  section.    POSTOPERATIVE DIAGNOSIS:  Intrauterine pregnancy at 39-2/7 weeks, history of   previous  section x2, here for elective repeat  section.    SURGEON:  Erich Beltran MD    ASSISTANT:  Kya Mehta MD    ESTIMATED BLOOD LOSS:  500 mL    COMPLICATIONS:  None.    PROCEDURE:  Repeat low transverse uterine  section.    ANESTHESIOLOGIST:  Chuck Varela MD    ANESTHESIA:  Failed spinal, then general endotracheal tube anesthesia.    COMPLICATIONS:  None.    DRAINS:  Carbajal catheters.    SPECIMENS:  None.    INDICATIONS:  This patient is a 22-year-old white female  4, para   2-0-1-2, currently at 39-2/7 weeks, admitted for elective repeat    section.    FINDINGS:  Apgar scores 8 and 9, cephalic presentation, normal pelvis.    Posterior placenta.    DESCRIPTION OF OPERATION:  After adequately being counseled, the patient was   taken to the operating room.  Spinal anesthetic was placed.  The patient was   prepped and draped in the usual sterile fashion.  Pfannenstiel skin incision   made with a scalpel.  At that point, the patient was having discomfort and   general endotracheal tube anesthesia was induced.  The incision was taken down   to the fascia.  The fascia was incised with scalpel and fascial incision   taken laterally on both sides with Rodney scissors.  Rectus fascia dissected off   the underlying rectus muscles both superiorly and inferiorly and the rectus   muscles were split in the midline.  The peritoneal cavity was entered sharply   with Metzenbaum scissors and the peritoneal incision taken superiorly and   inferiorly and bladder blade placed over the bladder.  Next, a bladder flap   was developed both sharply and bluntly and then the bladder blade replaced   over the bladder.  Next, a  low transverse uterine incision was made with a   scalpel.  The infant was delivered, bulb suctioned, umbilical cord clamped and   cut, and the infant handed off to pediatrics.  Placenta was removed from the   uterus and uterine cavity was cleansed with a moist laparotomy sponge.  Uterus   was exteriorized and the uterine incision was closed in 2 layers using 0   Vicryl in a running locking fashion.  Good hemostasis noted.  Uterus returned   to the abdominal pelvic cavity and the pelvis was irrigated and suctioned.    Peritoneal lining was closed using running nonlocked stitch of 0 Vicryl.    Rectus muscles were reapproximated using interrupted stitch of 0 chromic and   the rectus fascia closed using running nonlocked stitch of 0 Vicryl.    Subcutaneous tissues were reapproximated using several interrupted stitches of   0 Vicryl.  Skin was closed using surgical staples and a pressure dressing was   applied.  The patient was taken to recovery room in good condition.  No   complications noted.       ____________________________________     MD BEE DENSON / TAZ    DD:  05/31/2018 12:43:20  DT:  05/31/2018 13:45:42    D#:  [id number]  Job#:  [id number]

## 2018-05-31 NOTE — PROGRESS NOTES
Pt arrived via bed with infant in arms and belongings to S302.  Report received from Tonie.  Pt oriented to room, call light & infant security.  Assessment done fundus firm, lochia light, vital signs stable.  IV patent on pump.  Carbajal draining to gravity.  Intermittent sequentials stockings in place.  Incision dressing clean, dry & intact.  Instructions given on incentive spirometer and pt demonstrated/verbalized understanding.    Pt denies complaints of pain, see MAR. Pt aware of dangers related to sleeping with infant, reviewed plan of care with pt & encouraged to call with needs. Call light in place

## 2018-06-01 LAB
ERYTHROCYTE [DISTWIDTH] IN BLOOD BY AUTOMATED COUNT: 47.5 FL (ref 35.9–50)
HCT VFR BLD AUTO: 29.1 % (ref 37–47)
HGB BLD-MCNC: 9.4 G/DL (ref 12–16)
MCH RBC QN AUTO: 31.4 PG (ref 27–33)
MCHC RBC AUTO-ENTMCNC: 32.3 G/DL (ref 33.6–35)
MCV RBC AUTO: 97.3 FL (ref 81.4–97.8)
PLATELET # BLD AUTO: 176 K/UL (ref 164–446)
PMV BLD AUTO: 11.1 FL (ref 9–12.9)
RBC # BLD AUTO: 2.99 M/UL (ref 4.2–5.4)
WBC # BLD AUTO: 9.1 K/UL (ref 4.8–10.8)

## 2018-06-01 PROCEDURE — 85027 COMPLETE CBC AUTOMATED: CPT

## 2018-06-01 PROCEDURE — 700112 HCHG RX REV CODE 229: Performed by: OBSTETRICS & GYNECOLOGY

## 2018-06-01 PROCEDURE — 700102 HCHG RX REV CODE 250 W/ 637 OVERRIDE(OP): Performed by: OBSTETRICS & GYNECOLOGY

## 2018-06-01 PROCEDURE — A9270 NON-COVERED ITEM OR SERVICE: HCPCS | Performed by: OBSTETRICS & GYNECOLOGY

## 2018-06-01 PROCEDURE — 36415 COLL VENOUS BLD VENIPUNCTURE: CPT

## 2018-06-01 PROCEDURE — 770002 HCHG ROOM/CARE - OB PRIVATE (112)

## 2018-06-01 PROCEDURE — 700111 HCHG RX REV CODE 636 W/ 250 OVERRIDE (IP): Performed by: ANESTHESIOLOGY

## 2018-06-01 RX ORDER — OXYCODONE HYDROCHLORIDE 10 MG/1
10 TABLET ORAL EVERY 4 HOURS PRN
Status: DISCONTINUED | OUTPATIENT
Start: 2018-06-01 | End: 2018-06-03 | Stop reason: HOSPADM

## 2018-06-01 RX ORDER — DIPHENHYDRAMINE HCL 25 MG
25 TABLET ORAL EVERY 6 HOURS PRN
Status: DISCONTINUED | OUTPATIENT
Start: 2018-06-01 | End: 2018-06-03 | Stop reason: HOSPADM

## 2018-06-01 RX ORDER — ACETAMINOPHEN 325 MG/1
325 TABLET ORAL EVERY 4 HOURS PRN
Status: DISCONTINUED | OUTPATIENT
Start: 2018-06-01 | End: 2018-06-03 | Stop reason: HOSPADM

## 2018-06-01 RX ORDER — DIPHENHYDRAMINE HYDROCHLORIDE 50 MG/ML
25 INJECTION INTRAMUSCULAR; INTRAVENOUS EVERY 6 HOURS PRN
Status: DISCONTINUED | OUTPATIENT
Start: 2018-06-01 | End: 2018-06-03 | Stop reason: HOSPADM

## 2018-06-01 RX ORDER — OXYCODONE HYDROCHLORIDE AND ACETAMINOPHEN 5; 325 MG/1; MG/1
1 TABLET ORAL EVERY 4 HOURS PRN
Status: DISCONTINUED | OUTPATIENT
Start: 2018-06-01 | End: 2018-06-03 | Stop reason: HOSPADM

## 2018-06-01 RX ORDER — IBUPROFEN 600 MG/1
600 TABLET ORAL EVERY 6 HOURS PRN
Status: DISCONTINUED | OUTPATIENT
Start: 2018-06-01 | End: 2018-06-03 | Stop reason: HOSPADM

## 2018-06-01 RX ORDER — MORPHINE SULFATE 4 MG/ML
4 INJECTION, SOLUTION INTRAMUSCULAR; INTRAVENOUS
Status: DISCONTINUED | OUTPATIENT
Start: 2018-06-01 | End: 2018-06-03 | Stop reason: HOSPADM

## 2018-06-01 RX ADMIN — OXYCODONE HYDROCHLORIDE AND ACETAMINOPHEN 1 TABLET: 5; 325 TABLET ORAL at 17:20

## 2018-06-01 RX ADMIN — ACETAMINOPHEN 325 MG: 325 TABLET, FILM COATED ORAL at 14:21

## 2018-06-01 RX ADMIN — DOCUSATE SODIUM 100 MG: 100 CAPSULE ORAL at 11:57

## 2018-06-01 RX ADMIN — IBUPROFEN 600 MG: 600 TABLET, FILM COATED ORAL at 11:57

## 2018-06-01 RX ADMIN — KETOROLAC TROMETHAMINE 30 MG: 30 INJECTION, SOLUTION INTRAMUSCULAR at 01:14

## 2018-06-01 RX ADMIN — IBUPROFEN 600 MG: 600 TABLET, FILM COATED ORAL at 19:30

## 2018-06-01 RX ADMIN — OXYCODONE HYDROCHLORIDE AND ACETAMINOPHEN 1 TABLET: 5; 325 TABLET ORAL at 23:35

## 2018-06-01 RX ADMIN — KETOROLAC TROMETHAMINE 30 MG: 30 INJECTION, SOLUTION INTRAMUSCULAR at 06:16

## 2018-06-01 ASSESSMENT — PAIN SCALES - GENERAL
PAINLEVEL_OUTOF10: 2
PAINLEVEL_OUTOF10: 8
PAINLEVEL_OUTOF10: 6
PAINLEVEL_OUTOF10: 8
PAINLEVEL_OUTOF10: 8
PAINLEVEL_OUTOF10: 3
PAINLEVEL_OUTOF10: 2

## 2018-06-01 NOTE — PROGRESS NOTES
Urine output inadequate (135 mL over 6 hours). Toradol held at this time, started LR at 125 mL/hr.   Pt did vomit 600 cc of brown fluid around 1900 but has been tolerating PO foods and liquids and has not had any nausea since that time.     Will continue to monitor urine output.

## 2018-06-01 NOTE — CARE PLAN
Problem: Altered physiologic condition related to postoperative  delivery  Goal: Patient physiologically stable as evidenced by normal lochia, palpable uterine involution and vital signs within normal limits  Outcome: PROGRESSING AS EXPECTED  Fundus firm, lochia light, vitals stable. Second bag of Pitocin now complete.     Problem: Alteration in comfort related to surgical incision and/or after birth pains  Goal: Patient is able to ambulate, care for self and infant with acceptable pain level  Outcome: PROGRESSING AS EXPECTED  Pt states pain is being adequately controlled, able to ambulate and care for self and infant. Encouraged pt to call for PRN pain medications as needed. Toradol administered as scheduled in MAR. Pain assessed q2-4 hours.

## 2018-06-01 NOTE — PROGRESS NOTES
"1900: Bedside report received, assumed care of pt.     Assessment complete, VSS, fundus firm, lochia light. Silver Mepilex dressing clean/dry/intact. Carbajal catheter in place. Pt with some nausea/vomitting, declines Zofran at this time. Bonding well with infant. Pt states pain is being well controlled and will call for PRN pain meds as needed.     Pt states she has been feeling a \"pinching\" sensation with latching infant. Educated about deep vs. shallow latch, and informed her to call RN for assistance with feeding. POC discussed with pt and family, questions answered, call light within reach.   "

## 2018-06-01 NOTE — CONSULTS
0915-mother with c/o discomfort when BF, assisted with and educated on proper positioning for deep latch, deep latch with widely-flanged lips achieved with minor adjustments to position, educated on normal course of BF the first 24-48-72 hours, educated on proper BF frequency and duration, written and verbal education provided, educated on outpatient assistance available, [facility], encouraged to call for assistance as needed.

## 2018-06-01 NOTE — PROGRESS NOTES
Name:   Natalie Ppo   Date/Time:  2018 6:28 AM  Gestational Age:  39w3d  Admit Date:   2018  Admitting Dx:   Pregnancy  PREVIOUS , 39+2 WEEKS  Labor and delivery, indication for care    POD# 1 S/P repeat     S:  Abdominal pain no   Ambulating   yes  Tolerating PO  yes  Flatus    yes  Bleeding   Yes, light lochia  Voiding   No, dent just removed this AM, has not voided yet   Dizziness   no  Breast feeding  yes  Breast tenderness  no    O:  Pulse: 67  Blood Pressure: (!) 86/49 (RN Halina notified dof low b/p)     Temp  Av.8 °C (98.2 °F)  Min: 36.4 °C (97.5 °F)  Max: 37.1 °C (98.8 °F)  Heart: regular rate and rhythm without gallops or murmurs  Lungs: clear bases  Abdomen: flat and soft/nontender / bowelsounds present / incision clean and dry.  Extremities: non-tender  Catheter: DC'd    Intake/Output Summary (Last 24 hours) at 18 0628  Last data filed at 18 0400   Gross per 24 hour   Intake             3100 ml   Output             2425 ml   Net              675 ml       A:  POD# 1 S/P repeat   Stable/progressing well    P:  Routine C/S Postpartum care, continue pain management, encourage ambulation, anticipate DC POD#2-3     Kya Mehta M.D.

## 2018-06-01 NOTE — DISCHARGE PLANNING
:    Referral: TRUDY may have financial issues and was unable to pay for prescriptions to treat trichomoniasis.  No treatment since 5/15/18.  Patient also adopted out 2nd baby.    Intervention:  Discussed referral with RN.  Explained that TRUDY has [place] [employer] which provides prescription coverage.  TRUDY has insurance to fill her prescriptions if she chooses to do so.  Also discussed that adopting out her second child does not need a  consult.      Per RN, MOB has been appropriate and RN does not have concerns.      Plan:   Intervention is not needed.

## 2018-06-01 NOTE — PROGRESS NOTES
Pt assisted to restroom with this RN, eloise provided, ambulated to bed without difficulty.  Call light in place, encouraged to call with needs

## 2018-06-02 PROCEDURE — A9270 NON-COVERED ITEM OR SERVICE: HCPCS | Performed by: OBSTETRICS & GYNECOLOGY

## 2018-06-02 PROCEDURE — 700112 HCHG RX REV CODE 229: Performed by: OBSTETRICS & GYNECOLOGY

## 2018-06-02 PROCEDURE — 770002 HCHG ROOM/CARE - OB PRIVATE (112)

## 2018-06-02 PROCEDURE — 700102 HCHG RX REV CODE 250 W/ 637 OVERRIDE(OP): Performed by: OBSTETRICS & GYNECOLOGY

## 2018-06-02 RX ADMIN — IBUPROFEN 600 MG: 600 TABLET, FILM COATED ORAL at 20:12

## 2018-06-02 RX ADMIN — IBUPROFEN 600 MG: 600 TABLET, FILM COATED ORAL at 08:04

## 2018-06-02 RX ADMIN — SIMETHICONE CHEW TAB 80 MG 80 MG: 80 TABLET ORAL at 23:29

## 2018-06-02 RX ADMIN — OXYCODONE HYDROCHLORIDE 10 MG: 10 TABLET ORAL at 23:26

## 2018-06-02 RX ADMIN — OXYCODONE HYDROCHLORIDE AND ACETAMINOPHEN 1 TABLET: 5; 325 TABLET ORAL at 14:13

## 2018-06-02 RX ADMIN — DOCUSATE SODIUM 100 MG: 100 CAPSULE ORAL at 20:12

## 2018-06-02 RX ADMIN — OXYCODONE HYDROCHLORIDE AND ACETAMINOPHEN 1 TABLET: 5; 325 TABLET ORAL at 04:44

## 2018-06-02 RX ADMIN — OXYCODONE HYDROCHLORIDE AND ACETAMINOPHEN 1 TABLET: 5; 325 TABLET ORAL at 19:01

## 2018-06-02 RX ADMIN — IBUPROFEN 600 MG: 600 TABLET, FILM COATED ORAL at 01:40

## 2018-06-02 RX ADMIN — IBUPROFEN 600 MG: 600 TABLET, FILM COATED ORAL at 14:13

## 2018-06-02 ASSESSMENT — PAIN SCALES - GENERAL
PAINLEVEL_OUTOF10: 7
PAINLEVEL_OUTOF10: 1
PAINLEVEL_OUTOF10: 5
PAINLEVEL_OUTOF10: 6
PAINLEVEL_OUTOF10: 4
PAINLEVEL_OUTOF10: 7
PAINLEVEL_OUTOF10: 6
PAINLEVEL_OUTOF10: 6
PAINLEVEL_OUTOF10: 7
PAINLEVEL_OUTOF10: 1
PAINLEVEL_OUTOF10: 3

## 2018-06-02 NOTE — PROGRESS NOTES
"Mother states BF is \"going much better\", states baby is getting a deep latch much more easily, states continued understanding of adequate feeding frequency and duration, states awareness of outpatient assistance available, encouraged to call for assistance as needed.    "

## 2018-06-02 NOTE — PROGRESS NOTES
0100: Report off from YUE Reynolds. Assumed care of pt.   Medicated for pain per MAR. Call light within reach.

## 2018-06-02 NOTE — CARE PLAN
Problem: Altered physiologic condition related to postoperative  delivery  Goal: Patient physiologically stable as evidenced by normal lochia, palpable uterine involution and vital signs within normal limits  Outcome: PROGRESSING AS EXPECTED  Patient has scant to light lochia with a firm palpable uterus.  Vital signs are within defined limits.  Assessment will continue.     Problem: Alteration in comfort related to surgical incision and/or after birth pains  Goal: Patient verbalizes acceptable pain level  Outcome: PROGRESSING AS EXPECTED  Patient will ask for pain medication when needed.  Pain assessment will continue.

## 2018-06-02 NOTE — PROGRESS NOTES
Patient assessment done.  Patient encouraged to ambulate in the hallways.  Condition will continue to be monitored.

## 2018-06-02 NOTE — PROGRESS NOTES
Name:   Natalie Pop   Date/Time:  2018 6:28 AM  Gestational Age:  39w4d  Admit Date:   2018  Admitting Dx:   Pregnancy  PREVIOUS , 39+2 WEEKS  Labor and delivery, indication for care    POD# 2 S/P repeat C/S    S:  Abdominal pain yes   Ambulating   yes  Tolerating PO  yes  Flatus    yes  Bleeding   Yes, light lochia  Voiding   yes   Dizziness   no  Breast feeding  yes  Breast tenderness  no    O:  Pulse: (!) 59 (notified evan)  Blood Pressure: 103/63     Temp  Av.7 °C (98.1 °F)  Min: 36.5 °C (97.7 °F)  Max: 37.2 °C (99 °F)  Heart: regular rate and rhythm without gallops or murmurs  Lungs: clear bases  Abdomen: flat and appropriately tender / bowelsounds present / incision clean and dry.  Extremities: non-tender  Catheter: DC'd  No intake or output data in the 24 hours ending 18 0628    A:  POD# 2 S/P repeat C/S  Stable/progressing well   Continues to have low normal BP, denies SOB, dizziness, ambulating without difficulty. H&H stable. Other vitals stable and WNL. Still having moderate pain, incision appears well healing.     P:  Routine C/S Postpartum care, continue pain management, encourage ambulation, anticipate DC POD#3     Kya Mehta M.D.

## 2018-06-03 VITALS
DIASTOLIC BLOOD PRESSURE: 68 MMHG | TEMPERATURE: 97 F | RESPIRATION RATE: 18 BRPM | BODY MASS INDEX: 28 KG/M2 | WEIGHT: 200 LBS | SYSTOLIC BLOOD PRESSURE: 102 MMHG | HEART RATE: 65 BPM | HEIGHT: 71 IN | OXYGEN SATURATION: 96 %

## 2018-06-03 PROBLEM — Z98.891 S/P C-SECTION: Status: ACTIVE | Noted: 2018-06-03

## 2018-06-03 PROCEDURE — 700102 HCHG RX REV CODE 250 W/ 637 OVERRIDE(OP): Performed by: OBSTETRICS & GYNECOLOGY

## 2018-06-03 PROCEDURE — A9270 NON-COVERED ITEM OR SERVICE: HCPCS | Performed by: OBSTETRICS & GYNECOLOGY

## 2018-06-03 RX ORDER — OXYCODONE HYDROCHLORIDE AND ACETAMINOPHEN 5; 325 MG/1; MG/1
1 TABLET ORAL EVERY 4 HOURS PRN
Qty: 30 TAB | Refills: 0 | Status: SHIPPED | OUTPATIENT
Start: 2018-06-03 | End: 2018-06-10

## 2018-06-03 RX ORDER — PSEUDOEPHEDRINE HCL 30 MG
100 TABLET ORAL 2 TIMES DAILY PRN
Qty: 14 CAP | Refills: 0 | Status: SHIPPED | OUTPATIENT
Start: 2018-06-03 | End: 2018-06-10

## 2018-06-03 RX ADMIN — IBUPROFEN 600 MG: 600 TABLET, FILM COATED ORAL at 12:41

## 2018-06-03 RX ADMIN — OXYCODONE HYDROCHLORIDE 10 MG: 10 TABLET ORAL at 05:53

## 2018-06-03 RX ADMIN — OXYCODONE HYDROCHLORIDE 10 MG: 10 TABLET ORAL at 10:25

## 2018-06-03 RX ADMIN — IBUPROFEN 600 MG: 600 TABLET, FILM COATED ORAL at 05:53

## 2018-06-03 ASSESSMENT — PAIN SCALES - GENERAL
PAINLEVEL_OUTOF10: 4
PAINLEVEL_OUTOF10: 1
PAINLEVEL_OUTOF10: 6
PAINLEVEL_OUTOF10: 1
PAINLEVEL_OUTOF10: 7
PAINLEVEL_OUTOF10: 0

## 2018-06-03 NOTE — PROGRESS NOTES
Discharged to home-pt given written discharge instructions and prescriptions-all questions answered-pt escorted out by staff.

## 2018-06-03 NOTE — DISCHARGE SUMMARY
Discharge Summary:      Natalie Pop      Admit Date:   2018  Discharge Date:  6/3/2018     Admitting diagnosis:  Pregnancy  PREVIOUS , 39+2 WEEKS  Labor and delivery, indication for care  Discharge Diagnosis: Status post  for repeat.  Pregnancy Complications: none  Tubal Ligation:  no        History:  Past Medical History:   Diagnosis Date   • Inflammatory disease of ovary, fallopian tube, pelvic cellular tissue, and peritoneum      OB History    Para Term  AB Living   4 2 2   1 2   SAB TAB Ectopic Molar Multiple Live Births     1       2      # Outcome Date GA Lbr Hussain/2nd Weight Sex Delivery Anes PTL Lv   4 Current            3 Term 04/13/15 40w0d  3.033 kg (6 lb 11 oz) F CS-LTranv Spinal N LONG      Birth Comments: C/Section for repeat    2 Term 12 40w0d  3.487 kg (7 lb 11 oz) M CS-LTranv Spinal N LONG      Birth Comments: baby's heart rate was going down   1 TAB  4w0d                  Pcn [penicillins]  Patient Active Problem List    Diagnosis Date Noted   • PUPPP (pruritic urticarial papules and plaques of pregnancy) 2018   • Trichomoniasis 05/15/2018   • Supervision of other high risk pregnancies, third trimester 05/15/2018   • History of  delivery affecting pregnancy 2018        Hospital Course:   22 y.o. , now para 3, was admitted with the above mentioned diagnosis, underwent Repeat  repeat,  for repeat. Patient postpartum course was unremarkable, with progressive advancement in diet , ambulation and toleration of oral analgesia. Patient without complaints today and desires discharge.      Vitals:    18 2000 18 0000 18 0800 18   BP: (!) 95/69 103/63 (!) 91/66 100/70   Pulse: (!) 59  65 87   Resp: 18 19 20 14   Temp: 37.2 °C (99 °F) 36.5 °C (97.7 °F) 37.1 °C (98.8 °F) 36.8 °C (98.2 °F)   TempSrc:       SpO2: 96% 97% 98% 97%   Weight:       Height:           Current  Facility-Administered Medications   Medication Dose   • ibuprofen (MOTRIN) tablet 600 mg  600 mg   • acetaminophen (TYLENOL) tablet 325 mg  325 mg   • oxyCODONE-acetaminophen (PERCOCET) 5-325 MG per tablet 1 Tab  1 Tab   • oxyCODONE immediate release (ROXICODONE) tablet 10 mg  10 mg   • morphine (pf) 4 mg/ml injection 4 mg  4 mg   • diphenhydrAMINE (BENADRYL) tablet/capsule 25 mg  25 mg    Or   • diphenhydrAMINE (BENADRYL) injection 25 mg  25 mg   • oxytocin (PITOCIN) infusion (for postpartum)   mL/hr   • LR infusion     • docusate sodium (COLACE) capsule 100 mg  100 mg   • PRN oxytocin (PITOCIN) (20 Units/1000 mL) PRN for excessive uterine bleeding - See Admin Instr  125-999 mL/hr   • miSOPROStol (CYTOTEC) tablet 600 mcg  600 mcg   • methylergonovine (METHERGINE) injection 0.2 mg  0.2 mg   • simethicone (MYLICON) chewable tab 80 mg  80 mg   • ondansetron (ZOFRAN) syringe/vial injection 4 mg  4 mg    Or   • ondansetron (ZOFRAN ODT) dispertab 4 mg  4 mg       Exam:  Breast Exam: negative  Abdomen: Abdomen soft, appropriately tender. BS normal. No masses,  No organomegaly  Fundus Non Tender: yes  Incision: healing well with good reapproximation  Perineum: perineum intact  Extremity: HAZEL,  no edema, redness or tenderness in the calves or thighs     Labs:  Recent Labs      05/31/18   0810  06/01/18   0430   WBC  7.0  9.1   RBC  3.85*  2.99*   HEMOGLOBIN  11.6*  9.4*   HEMATOCRIT  34.1*  29.1*   MCV  88.6  97.3   MCH  30.1  31.4   MCHC  34.0  32.3*   RDW  42.5  47.5   PLATELETCT  208  176   MPV  11.1  11.1        Activity:   Discharge to home  Pelvic Rest x 6 weeks    Assessment:  normal postpartum course  Discharge Assessment: No areas of skin breakdown/redness; surgical incision intact/healing, Taking adequate diet and fluids, No breast redness or severe pain of breast, no heavy vaginal bleeding or foul vaginal discharge. Ambulating without dizziness or difficulty. No BM yet but is passing flatus. Urinating  normally. Desires to go home today.   Discussed follow up and return precautions.     Follow up: [facility] or [facility] in 5 weeks for vaginal; 1 week for incision check.   To resume daily PNV and iron supplement if needed with hydration.   Patient to RT [facility] or ER if any of the following occur:  Fever over 100.5  Severe abdominal pain  Red streaks or painful masses in the breasts  Foul smelling discharge or lochia  Heavy vaginal bleeding saturating a pad per hour  S/s of PP depression     Discharge Meds:   No current outpatient prescriptions on file.       Kya Mehta M.D.

## 2018-06-03 NOTE — CARE PLAN
Problem: Altered physiologic condition related to postoperative  delivery  Goal: Patient physiologically stable as evidenced by normal lochia, palpable uterine involution and vital signs within normal limits  Outcome: PROGRESSING AS EXPECTED  Fundus firm. Lochia light. Vital signs WDL.     Problem: Potential for postpartum infection related to surgical incision, compromised uterine condition, urinary tract or respiratory compromise  Goal: Patient will be afebrile and free from signs and symptoms of infection  Outcome: PROGRESSING AS EXPECTED  Patient is afebrile. Incision clean, dry, intact. No redness or drainage noted. No signs and symptoms of infection noted.

## 2018-06-03 NOTE — DISCHARGE INSTRUCTIONS
POSTPARTUM DISCHARGE INSTRUCTIONS FOR MOM    YOB: 1995   Age: 22 y.o.               Admit Date: 2018     Discharge Date: 6/3/2018  Attending Doctor:  Erich Beltran M.D.                  Allergies:  Pcn [penicillins]    Discharged to home by car. Discharged via wheelchair, hospital escort: Yes.  Special equipment needed: Not Applicable  Belongings with: Personal  Be sure to schedule a follow-up appointment with your primary care doctor or any specialists as instructed.     Discharge Plan:   Diet Plan: Discussed  Activity Level: Discussed  Confirmed Follow up Appointment: Appointment Scheduled  Confirmed Symptoms Management: Discussed  Medication Reconciliation Updated: Yes  Influenza Vaccine Indication: Indicated: Not available from distributor/    REASONS TO CALL YOUR OBSTETRICIAN:  1.   Persistent fever or shaking chills (Temperature higher than 100.4)  2.   Heavy bleeding (soaking more than 1 pad per hour); Passing clots  3.   Foul odor from vagina  4.   Mastitis (Breast infection; breast pain, chills, fever, redness)  5.   Urinary pain, burning or frequency  6.   Episiotomy infection  7.   Abdominal incision infection  8.   Severe depression longer than 24 hours    HAND WASHING  · Prior to handling the baby.  · Before breastfeeding or bottle feeding baby.  · After using the bathroom or changing the baby's diaper.    WOUND CARE  Ask your physician for additional care instructions.  In general:    ·  Incision:      · Keep clean and dry.    · Do NOT lift anything heavier than your baby for up to 6 weeks.    · There should not be any opening or pus.      VAGINAL CARE  · Nothing inside vagina for 6 weeks: no sexual intercourse, tampons or douching.  · Bleeding may continue for 2-4 weeks.  Amount may vary.    · Call your physician for heavy bleeding which means soaking more than 1 pad per hour    BIRTH CONTROL  · It is possible to become pregnant at any time after delivery and  "while breastfeeding.  · Plan to discuss a method of birth control with your physician at your follow up visit. visit.    DIET AND ELIMINATION  · Eating more fiber (bran cereal, fruits, and vegetables) and drinking plenty of fluids will help to avoid constipation.  · Urinary frequency after childbirth is normal.    POSTPARTUM BLUES  During the first few days after birth, you may experience a sense of the \"blues\" which may include impatience, irritability or even crying.  These feeling come and go quickly.  However, as many as 1 in 10 women experience emotional symptoms known as postpartum depression.    Postpartum depression:  May start as early as the second or third day after delivery or take several weeks or months to develop.  Symptoms of \"blues\" are present, but are more intense:  Crying spells; loss of appetite; feelings of hopelessness or loss of control; fear of touching the baby; over concern or no concern at all about the baby; little or no concern about your own appearance/caring for yourself; and/or inability to sleep or excessive sleeping.  Contact your physician if you are experiencing any of these symptoms.    Crisis Hotline:  · [place] Crisis Hotline:  6-471-UQUVMJQ  Or 1-872.424.2318  · [place] Crisis Hotline:  1-810.119.6415  Or 380-688-6776    PREVENTING SHAKEN BABY:  If you are angry or stressed, PUT THE BABY IN THE CRIB, step away, take some deep breaths, and wait until you are calm to care for the baby.  DO NOT SHAKE THE BABY.  You are not alone, call a supporter for help.    · Crisis Call Center 24/7 crisis line 308-276-3265 or 1-378.601.3745  · You can also text them, text \"ANSWER\" to 272230    QUIT SMOKING/TOBACCO USE:  I understand the use of any tobacco products increases my chance of suffering from future heart disease and could cause other illnesses which may shorten my life. Quitting the use of tobacco products is the single most important thing I can do to improve my health. For further " information on smoking / tobacco cessation call a Toll Free Quit Line at 1-300.264.2793 (*National Cancer [place]) or 1-716.509.4980 (American Lung Association) or you can access the web based program at www.lungusa.org.    · [place] Tobacco Users Help Line:  (262) 430-3213       Toll Free: 1-297.284.4404  · Quit Tobacco Program [facility] (107)249-2992    DEPRESSION / SUICIDE RISK:  As you are discharged from this [facility], it is important to learn how to keep safe from harming yourself.    Recognize the warning signs:  · Abrupt changes in personality, positive or negative- including increase in energy   · Giving away possessions  · Change in eating patterns- significant weight changes-  positive or negative  · Change in sleeping patterns- unable to sleep or sleeping all the time   · Unwillingness or inability to communicate  · Depression  · Unusual sadness, discouragement and loneliness  · Talk of wanting to die  · Neglect of personal appearance   · Rebelliousness- reckless behavior  · Withdrawal from people/activities they love  · Confusion- inability to concentrate     If you or a loved one observes any of these behaviors or has concerns about self-harm, here's what you can do:  · Talk about it- your feelings and reasons for harming yourself  · Remove any means that you might use to hurt yourself (examples: pills, rope, extension cords, firearm)  · Get professional help from the community (Mental Health, Substance Abuse, psychological counseling)  · Do not be alone:Call your Safe Contact- someone whom you trust who will be there for you.  · Call your local CRISIS HOTLINE 910-8067 or 830-098-4314  · Call your local Children's Mobile Crisis Response Team [place] (638) 180-7477 or www.[employer]  · Call the toll free National Suicide Prevention Hotlines   · National Suicide Prevention Lifeline 272-330-BCNV (0322)  · National Hope Line Network 800-SUICIDE  (878-3516)    DISCHARGE SURVEY:  Thank you for choosing [facility].  We hope we provided you with very good care.  You may be receiving a survey in the mail.  Please fill it out.  Your opinion is valuable to us.    ADDITIONAL EDUCATIONAL MATERIALS GIVEN TO PATIENT:        My signature on this form indicates that:  1.  I have reviewed and understand the above information  2.  My questions regarding this information have been answered to my satisfaction.  3.  I have formulated a plan with my discharge nurse to obtain my prescribed medication for home.

## 2018-06-07 ENCOUNTER — TELEPHONE (OUTPATIENT)
Dept: OBGYN | Facility: CLINIC | Age: 23
End: 2018-06-07

## 2018-06-07 NOTE — TELEPHONE ENCOUNTER
Pt called c/o a rash on her stomach that is itching and oozing with pus. Pt states it happened after she got her staples and she has had this before with her other babies. Pt would like to know what's going on? Please advise. Thank you

## 2018-06-08 NOTE — TELEPHONE ENCOUNTER
Called up to the front and spoke with Jocelynn, I explained that the patient needed to be seen today per Padmini and she states she will call and schedule the patient.

## 2018-12-20 ENCOUNTER — APPOINTMENT (OUTPATIENT)
Dept: RADIOLOGY | Facility: MEDICAL CENTER | Age: 23
End: 2018-12-20
Attending: EMERGENCY MEDICINE
Payer: COMMERCIAL

## 2018-12-20 ENCOUNTER — HOSPITAL ENCOUNTER (OUTPATIENT)
Facility: MEDICAL CENTER | Age: 23
End: 2018-12-21
Attending: EMERGENCY MEDICINE | Admitting: OBSTETRICS & GYNECOLOGY
Payer: COMMERCIAL

## 2018-12-20 DIAGNOSIS — G89.18 POST-OP PAIN: ICD-10-CM

## 2018-12-20 DIAGNOSIS — E87.20 LACTIC ACIDOSIS: ICD-10-CM

## 2018-12-20 DIAGNOSIS — N17.9 AKI (ACUTE KIDNEY INJURY) (HCC): ICD-10-CM

## 2018-12-20 DIAGNOSIS — O00.90 RUPTURED ECTOPIC PREGNANCY: ICD-10-CM

## 2018-12-20 DIAGNOSIS — D64.9 ANEMIA, UNSPECIFIED TYPE: ICD-10-CM

## 2018-12-20 LAB
ABO GROUP BLD: NORMAL
ALBUMIN SERPL BCP-MCNC: 3.5 G/DL (ref 3.2–4.9)
ALBUMIN/GLOB SERPL: 1.7 G/DL
ALP SERPL-CCNC: 58 U/L (ref 30–99)
ALT SERPL-CCNC: 10 U/L (ref 2–50)
ANION GAP SERPL CALC-SCNC: 14 MMOL/L (ref 0–11.9)
APTT PPP: 25 SEC (ref 24.7–36)
AST SERPL-CCNC: 17 U/L (ref 12–45)
B-HCG SERPL-ACNC: ABNORMAL MIU/ML (ref 0–5)
BARCODED ABORH UBTYP: 6200
BARCODED PRD CODE UBPRD: NORMAL
BARCODED UNIT NUM UBUNT: NORMAL
BASOPHILS # BLD AUTO: 0.1 % (ref 0–1.8)
BASOPHILS # BLD: 0.02 K/UL (ref 0–0.12)
BILIRUB SERPL-MCNC: 0.5 MG/DL (ref 0.1–1.5)
BLD GP AB SCN SERPL QL: NORMAL
BUN SERPL-MCNC: 27 MG/DL (ref 8–22)
CALCIUM SERPL-MCNC: 8 MG/DL (ref 8.5–10.5)
CHLORIDE SERPL-SCNC: 107 MMOL/L (ref 96–112)
CO2 SERPL-SCNC: 14 MMOL/L (ref 20–33)
COMPONENT R 8504R: NORMAL
CREAT SERPL-MCNC: 2.38 MG/DL (ref 0.5–1.4)
EOSINOPHIL # BLD AUTO: 0 K/UL (ref 0–0.51)
EOSINOPHIL NFR BLD: 0 % (ref 0–6.9)
ERYTHROCYTE [DISTWIDTH] IN BLOOD BY AUTOMATED COUNT: 42.1 FL (ref 35.9–50)
GLOBULIN SER CALC-MCNC: 2.1 G/DL (ref 1.9–3.5)
GLUCOSE SERPL-MCNC: 127 MG/DL (ref 65–99)
HCG SERPL QL: POSITIVE
HCT VFR BLD AUTO: 22.3 % (ref 37–47)
HGB BLD-MCNC: 7.3 G/DL (ref 12–16)
IMM GRANULOCYTES # BLD AUTO: 0.16 K/UL (ref 0–0.11)
IMM GRANULOCYTES NFR BLD AUTO: 0.8 % (ref 0–0.9)
INR PPP: 1.23 (ref 0.87–1.13)
LACTATE BLD-SCNC: 2.6 MMOL/L (ref 0.5–2)
LIPASE SERPL-CCNC: 6 U/L (ref 11–82)
LYMPHOCYTES # BLD AUTO: 1.34 K/UL (ref 1–4.8)
LYMPHOCYTES NFR BLD: 6.9 % (ref 22–41)
MCH RBC QN AUTO: 28.6 PG (ref 27–33)
MCHC RBC AUTO-ENTMCNC: 32.7 G/DL (ref 33.6–35)
MCV RBC AUTO: 87.5 FL (ref 81.4–97.8)
MONOCYTES # BLD AUTO: 0.5 K/UL (ref 0–0.85)
MONOCYTES NFR BLD AUTO: 2.6 % (ref 0–13.4)
NEUTROPHILS # BLD AUTO: 17.32 K/UL (ref 2–7.15)
NEUTROPHILS NFR BLD: 89.6 % (ref 44–72)
NRBC # BLD AUTO: 0 K/UL
NRBC BLD-RTO: 0 /100 WBC
PLATELET # BLD AUTO: 209 K/UL (ref 164–446)
PMV BLD AUTO: 11.8 FL (ref 9–12.9)
POTASSIUM SERPL-SCNC: 5.2 MMOL/L (ref 3.6–5.5)
PRODUCT TYPE UPROD: NORMAL
PROT SERPL-MCNC: 5.6 G/DL (ref 6–8.2)
PROTHROMBIN TIME: 15.6 SEC (ref 12–14.6)
RBC # BLD AUTO: 2.55 M/UL (ref 4.2–5.4)
RH BLD: NORMAL
SODIUM SERPL-SCNC: 135 MMOL/L (ref 135–145)
UNIT STATUS USTAT: NORMAL
WBC # BLD AUTO: 19.3 K/UL (ref 4.8–10.8)

## 2018-12-20 PROCEDURE — 84702 CHORIONIC GONADOTROPIN TEST: CPT

## 2018-12-20 PROCEDURE — 160036 HCHG PACU - EA ADDL 30 MINS PHASE I: Performed by: OBSTETRICS & GYNECOLOGY

## 2018-12-20 PROCEDURE — 86850 RBC ANTIBODY SCREEN: CPT

## 2018-12-20 PROCEDURE — 700102 HCHG RX REV CODE 250 W/ 637 OVERRIDE(OP): Performed by: EMERGENCY MEDICINE

## 2018-12-20 PROCEDURE — 500886 HCHG PACK, LAPAROSCOPY: Performed by: OBSTETRICS & GYNECOLOGY

## 2018-12-20 PROCEDURE — 96374 THER/PROPH/DIAG INJ IV PUSH: CPT

## 2018-12-20 PROCEDURE — 76705 ECHO EXAM OF ABDOMEN: CPT

## 2018-12-20 PROCEDURE — A6404 STERILE GAUZE > 48 SQ IN: HCPCS | Performed by: OBSTETRICS & GYNECOLOGY

## 2018-12-20 PROCEDURE — 86923 COMPATIBILITY TEST ELECTRIC: CPT | Mod: 91

## 2018-12-20 PROCEDURE — 501838 HCHG SUTURE GENERAL: Performed by: OBSTETRICS & GYNECOLOGY

## 2018-12-20 PROCEDURE — 76801 OB US < 14 WKS SINGLE FETUS: CPT

## 2018-12-20 PROCEDURE — 36415 COLL VENOUS BLD VENIPUNCTURE: CPT

## 2018-12-20 PROCEDURE — 160041 HCHG SURGERY MINUTES - EA ADDL 1 MIN LEVEL 4: Performed by: OBSTETRICS & GYNECOLOGY

## 2018-12-20 PROCEDURE — 99291 CRITICAL CARE FIRST HOUR: CPT

## 2018-12-20 PROCEDURE — 700105 HCHG RX REV CODE 258: Performed by: EMERGENCY MEDICINE

## 2018-12-20 PROCEDURE — 80053 COMPREHEN METABOLIC PANEL: CPT

## 2018-12-20 PROCEDURE — 500868 HCHG NEEDLE, SURGI(VARES): Performed by: OBSTETRICS & GYNECOLOGY

## 2018-12-20 PROCEDURE — 501582 HCHG TROCAR, THRD BLADED: Performed by: OBSTETRICS & GYNECOLOGY

## 2018-12-20 PROCEDURE — 84703 CHORIONIC GONADOTROPIN ASSAY: CPT

## 2018-12-20 PROCEDURE — 85610 PROTHROMBIN TIME: CPT

## 2018-12-20 PROCEDURE — 501583 HCHG TROCAR, THRD CAN&SEAL 5X100: Performed by: OBSTETRICS & GYNECOLOGY

## 2018-12-20 PROCEDURE — 501399 HCHG SPECIMAN BAG, ENDO CATC: Performed by: OBSTETRICS & GYNECOLOGY

## 2018-12-20 PROCEDURE — 86901 BLOOD TYPING SEROLOGIC RH(D): CPT

## 2018-12-20 PROCEDURE — 502704 HCHG DEVICE, LIGASURE IMPACT: Performed by: OBSTETRICS & GYNECOLOGY

## 2018-12-20 PROCEDURE — 36430 TRANSFUSION BLD/BLD COMPNT: CPT

## 2018-12-20 PROCEDURE — 160048 HCHG OR STATISTICAL LEVEL 1-5: Performed by: OBSTETRICS & GYNECOLOGY

## 2018-12-20 PROCEDURE — 700111 HCHG RX REV CODE 636 W/ 250 OVERRIDE (IP)

## 2018-12-20 PROCEDURE — A9270 NON-COVERED ITEM OR SERVICE: HCPCS | Performed by: EMERGENCY MEDICINE

## 2018-12-20 PROCEDURE — A6402 STERILE GAUZE <= 16 SQ IN: HCPCS | Performed by: OBSTETRICS & GYNECOLOGY

## 2018-12-20 PROCEDURE — 85730 THROMBOPLASTIN TIME PARTIAL: CPT

## 2018-12-20 PROCEDURE — P9016 RBC LEUKOCYTES REDUCED: HCPCS

## 2018-12-20 PROCEDURE — 83690 ASSAY OF LIPASE: CPT

## 2018-12-20 PROCEDURE — 160029 HCHG SURGERY MINUTES - 1ST 30 MINS LEVEL 4: Performed by: OBSTETRICS & GYNECOLOGY

## 2018-12-20 PROCEDURE — 501572 HCHG TROCAR, SHIELD OBTU 5X100: Performed by: OBSTETRICS & GYNECOLOGY

## 2018-12-20 PROCEDURE — 86900 BLOOD TYPING SEROLOGIC ABO: CPT

## 2018-12-20 PROCEDURE — 700101 HCHG RX REV CODE 250

## 2018-12-20 PROCEDURE — 700111 HCHG RX REV CODE 636 W/ 250 OVERRIDE (IP): Performed by: EMERGENCY MEDICINE

## 2018-12-20 PROCEDURE — 160002 HCHG RECOVERY MINUTES (STAT): Performed by: OBSTETRICS & GYNECOLOGY

## 2018-12-20 PROCEDURE — 87040 BLOOD CULTURE FOR BACTERIA: CPT

## 2018-12-20 PROCEDURE — 160035 HCHG PACU - 1ST 60 MINS PHASE I: Performed by: OBSTETRICS & GYNECOLOGY

## 2018-12-20 PROCEDURE — 85025 COMPLETE CBC W/AUTO DIFF WBC: CPT

## 2018-12-20 PROCEDURE — 160009 HCHG ANES TIME/MIN: Performed by: OBSTETRICS & GYNECOLOGY

## 2018-12-20 PROCEDURE — 88305 TISSUE EXAM BY PATHOLOGIST: CPT

## 2018-12-20 PROCEDURE — 83605 ASSAY OF LACTIC ACID: CPT

## 2018-12-20 RX ORDER — ONDANSETRON 2 MG/ML
4 INJECTION INTRAMUSCULAR; INTRAVENOUS ONCE
Status: COMPLETED | OUTPATIENT
Start: 2018-12-20 | End: 2018-12-20

## 2018-12-20 RX ORDER — BUPIVACAINE HYDROCHLORIDE AND EPINEPHRINE 2.5; 5 MG/ML; UG/ML
INJECTION, SOLUTION EPIDURAL; INFILTRATION; INTRACAUDAL; PERINEURAL
Status: DISCONTINUED | OUTPATIENT
Start: 2018-12-20 | End: 2018-12-20 | Stop reason: HOSPADM

## 2018-12-20 RX ORDER — HALOPERIDOL 5 MG/ML
1 INJECTION INTRAMUSCULAR
Status: DISCONTINUED | OUTPATIENT
Start: 2018-12-20 | End: 2018-12-21 | Stop reason: HOSPADM

## 2018-12-20 RX ORDER — ONDANSETRON 4 MG/1
8 TABLET, ORALLY DISINTEGRATING ORAL EVERY 8 HOURS PRN
Status: DISCONTINUED | OUTPATIENT
Start: 2018-12-20 | End: 2018-12-21 | Stop reason: HOSPADM

## 2018-12-20 RX ORDER — SODIUM CHLORIDE, SODIUM LACTATE, POTASSIUM CHLORIDE, CALCIUM CHLORIDE 600; 310; 30; 20 MG/100ML; MG/100ML; MG/100ML; MG/100ML
INJECTION, SOLUTION INTRAVENOUS CONTINUOUS
Status: DISCONTINUED | OUTPATIENT
Start: 2018-12-20 | End: 2018-12-21 | Stop reason: HOSPADM

## 2018-12-20 RX ORDER — IBUPROFEN 600 MG/1
600 TABLET ORAL EVERY 6 HOURS PRN
Qty: 30 TAB | Refills: 1 | Status: SHIPPED | OUTPATIENT
Start: 2018-12-20 | End: 2020-05-19

## 2018-12-20 RX ORDER — ACETAMINOPHEN 325 MG/1
1000 TABLET ORAL ONCE
Status: COMPLETED | OUTPATIENT
Start: 2018-12-20 | End: 2018-12-20

## 2018-12-20 RX ORDER — OXYCODONE HYDROCHLORIDE AND ACETAMINOPHEN 5; 325 MG/1; MG/1
1 TABLET ORAL EVERY 4 HOURS PRN
Status: DISCONTINUED | OUTPATIENT
Start: 2018-12-20 | End: 2018-12-21 | Stop reason: HOSPADM

## 2018-12-20 RX ORDER — OXYCODONE HYDROCHLORIDE AND ACETAMINOPHEN 5; 325 MG/1; MG/1
1 TABLET ORAL EVERY 4 HOURS PRN
Qty: 15 TAB | Refills: 0 | Status: SHIPPED | OUTPATIENT
Start: 2018-12-20 | End: 2018-12-26

## 2018-12-20 RX ORDER — SODIUM CHLORIDE 9 MG/ML
1000 INJECTION, SOLUTION INTRAVENOUS ONCE
Status: COMPLETED | OUTPATIENT
Start: 2018-12-20 | End: 2018-12-21

## 2018-12-20 RX ORDER — ONDANSETRON 2 MG/ML
4 INJECTION INTRAMUSCULAR; INTRAVENOUS
Status: COMPLETED | OUTPATIENT
Start: 2018-12-20 | End: 2018-12-21

## 2018-12-20 RX ORDER — HYDROMORPHONE HYDROCHLORIDE 1 MG/ML
0.1 INJECTION, SOLUTION INTRAMUSCULAR; INTRAVENOUS; SUBCUTANEOUS
Status: DISCONTINUED | OUTPATIENT
Start: 2018-12-20 | End: 2018-12-21 | Stop reason: HOSPADM

## 2018-12-20 RX ORDER — DIPHENHYDRAMINE HYDROCHLORIDE 50 MG/ML
12.5 INJECTION INTRAMUSCULAR; INTRAVENOUS
Status: DISCONTINUED | OUTPATIENT
Start: 2018-12-20 | End: 2018-12-21 | Stop reason: HOSPADM

## 2018-12-20 RX ORDER — MAGNESIUM HYDROXIDE 1200 MG/15ML
LIQUID ORAL
Status: COMPLETED | OUTPATIENT
Start: 2018-12-20 | End: 2018-12-20

## 2018-12-20 RX ORDER — MEPERIDINE HYDROCHLORIDE 25 MG/ML
INJECTION INTRAMUSCULAR; INTRAVENOUS; SUBCUTANEOUS
Status: COMPLETED
Start: 2018-12-20 | End: 2018-12-20

## 2018-12-20 RX ORDER — SODIUM CHLORIDE, SODIUM LACTATE, POTASSIUM CHLORIDE, CALCIUM CHLORIDE 600; 310; 30; 20 MG/100ML; MG/100ML; MG/100ML; MG/100ML
INJECTION, SOLUTION INTRAVENOUS
Status: COMPLETED | OUTPATIENT
Start: 2018-12-20 | End: 2018-12-20

## 2018-12-20 RX ORDER — OXYCODONE HCL 5 MG/5 ML
5 SOLUTION, ORAL ORAL
Status: DISCONTINUED | OUTPATIENT
Start: 2018-12-20 | End: 2018-12-21 | Stop reason: HOSPADM

## 2018-12-20 RX ORDER — HYDROMORPHONE HYDROCHLORIDE 1 MG/ML
0.2 INJECTION, SOLUTION INTRAMUSCULAR; INTRAVENOUS; SUBCUTANEOUS
Status: DISCONTINUED | OUTPATIENT
Start: 2018-12-20 | End: 2018-12-21 | Stop reason: HOSPADM

## 2018-12-20 RX ORDER — HYDRALAZINE HYDROCHLORIDE 20 MG/ML
5 INJECTION INTRAMUSCULAR; INTRAVENOUS
Status: DISCONTINUED | OUTPATIENT
Start: 2018-12-20 | End: 2018-12-21 | Stop reason: HOSPADM

## 2018-12-20 RX ORDER — ONDANSETRON 8 MG/1
8 TABLET, ORALLY DISINTEGRATING ORAL EVERY 8 HOURS PRN
Qty: 10 TAB | Refills: 0 | Status: SHIPPED | OUTPATIENT
Start: 2018-12-20 | End: 2020-05-19

## 2018-12-20 RX ORDER — MEPERIDINE HYDROCHLORIDE 25 MG/ML
12.5 INJECTION INTRAMUSCULAR; INTRAVENOUS; SUBCUTANEOUS
Status: DISCONTINUED | OUTPATIENT
Start: 2018-12-20 | End: 2018-12-21 | Stop reason: HOSPADM

## 2018-12-20 RX ORDER — HYDROMORPHONE HYDROCHLORIDE 1 MG/ML
0.4 INJECTION, SOLUTION INTRAMUSCULAR; INTRAVENOUS; SUBCUTANEOUS
Status: DISCONTINUED | OUTPATIENT
Start: 2018-12-20 | End: 2018-12-21 | Stop reason: HOSPADM

## 2018-12-20 RX ORDER — OXYCODONE HCL 5 MG/5 ML
10 SOLUTION, ORAL ORAL
Status: DISCONTINUED | OUTPATIENT
Start: 2018-12-20 | End: 2018-12-21 | Stop reason: HOSPADM

## 2018-12-20 RX ORDER — IPRATROPIUM BROMIDE AND ALBUTEROL SULFATE 2.5; .5 MG/3ML; MG/3ML
3 SOLUTION RESPIRATORY (INHALATION)
Status: DISCONTINUED | OUTPATIENT
Start: 2018-12-20 | End: 2018-12-21 | Stop reason: HOSPADM

## 2018-12-20 RX ORDER — SODIUM CHLORIDE 9 MG/ML
1000 INJECTION, SOLUTION INTRAVENOUS ONCE
Status: COMPLETED | OUTPATIENT
Start: 2018-12-20 | End: 2018-12-20

## 2018-12-20 RX ORDER — IBUPROFEN 600 MG/1
600 TABLET ORAL EVERY 6 HOURS PRN
Status: DISCONTINUED | OUTPATIENT
Start: 2018-12-20 | End: 2018-12-21 | Stop reason: HOSPADM

## 2018-12-20 RX ADMIN — SODIUM CHLORIDE 1000 ML: 9 INJECTION, SOLUTION INTRAVENOUS at 18:09

## 2018-12-20 RX ADMIN — ACETAMINOPHEN 975 MG: 325 TABLET, FILM COATED ORAL at 19:00

## 2018-12-20 RX ADMIN — ONDANSETRON HYDROCHLORIDE 4 MG: 2 INJECTION, SOLUTION INTRAMUSCULAR; INTRAVENOUS at 19:00

## 2018-12-20 RX ADMIN — SODIUM CHLORIDE 1000 ML: 9 INJECTION, SOLUTION INTRAVENOUS at 19:00

## 2018-12-20 ASSESSMENT — PAIN SCALES - GENERAL
PAINLEVEL_OUTOF10: 10
PAINLEVEL_OUTOF10: 2
PAINLEVEL_OUTOF10: 10
PAINLEVEL_OUTOF10: 2
PAINLEVEL_OUTOF10: 3

## 2018-12-20 ASSESSMENT — LIFESTYLE VARIABLES: DO YOU DRINK ALCOHOL: NO

## 2018-12-21 VITALS
DIASTOLIC BLOOD PRESSURE: 52 MMHG | WEIGHT: 176.81 LBS | TEMPERATURE: 98.8 F | OXYGEN SATURATION: 97 % | SYSTOLIC BLOOD PRESSURE: 100 MMHG | BODY MASS INDEX: 24.75 KG/M2 | HEART RATE: 78 BPM | HEIGHT: 71 IN | RESPIRATION RATE: 15 BRPM

## 2018-12-21 LAB
ANION GAP SERPL CALC-SCNC: 6 MMOL/L (ref 0–11.9)
BASOPHILS # BLD AUTO: 0.2 % (ref 0–1.8)
BASOPHILS # BLD: 0.02 K/UL (ref 0–0.12)
BUN SERPL-MCNC: 21 MG/DL (ref 8–22)
CALCIUM SERPL-MCNC: 7.7 MG/DL (ref 8.5–10.5)
CHLORIDE SERPL-SCNC: 108 MMOL/L (ref 96–112)
CO2 SERPL-SCNC: 21 MMOL/L (ref 20–33)
CREAT SERPL-MCNC: 1 MG/DL (ref 0.5–1.4)
EOSINOPHIL # BLD AUTO: 0.01 K/UL (ref 0–0.51)
EOSINOPHIL NFR BLD: 0.1 % (ref 0–6.9)
ERYTHROCYTE [DISTWIDTH] IN BLOOD BY AUTOMATED COUNT: 44.1 FL (ref 35.9–50)
GLUCOSE SERPL-MCNC: 92 MG/DL (ref 65–99)
HCT VFR BLD AUTO: 19.5 % (ref 37–47)
HGB BLD-MCNC: 6.6 G/DL (ref 12–16)
IMM GRANULOCYTES # BLD AUTO: 0.05 K/UL (ref 0–0.11)
IMM GRANULOCYTES NFR BLD AUTO: 0.4 % (ref 0–0.9)
LYMPHOCYTES # BLD AUTO: 1.95 K/UL (ref 1–4.8)
LYMPHOCYTES NFR BLD: 16.6 % (ref 22–41)
MCH RBC QN AUTO: 29.7 PG (ref 27–33)
MCHC RBC AUTO-ENTMCNC: 33.8 G/DL (ref 33.6–35)
MCV RBC AUTO: 87.8 FL (ref 81.4–97.8)
MONOCYTES # BLD AUTO: 0.74 K/UL (ref 0–0.85)
MONOCYTES NFR BLD AUTO: 6.3 % (ref 0–13.4)
NEUTROPHILS # BLD AUTO: 8.99 K/UL (ref 2–7.15)
NEUTROPHILS NFR BLD: 76.4 % (ref 44–72)
NRBC # BLD AUTO: 0 K/UL
NRBC BLD-RTO: 0 /100 WBC
PATHOLOGY CONSULT NOTE: NORMAL
PLATELET # BLD AUTO: 106 K/UL (ref 164–446)
PMV BLD AUTO: 12.2 FL (ref 9–12.9)
POTASSIUM SERPL-SCNC: 3.9 MMOL/L (ref 3.6–5.5)
RBC # BLD AUTO: 2.22 M/UL (ref 4.2–5.4)
SODIUM SERPL-SCNC: 135 MMOL/L (ref 135–145)
WBC # BLD AUTO: 11.8 K/UL (ref 4.8–10.8)

## 2018-12-21 PROCEDURE — P9016 RBC LEUKOCYTES REDUCED: HCPCS

## 2018-12-21 PROCEDURE — 700111 HCHG RX REV CODE 636 W/ 250 OVERRIDE (IP)

## 2018-12-21 PROCEDURE — 85025 COMPLETE CBC W/AUTO DIFF WBC: CPT

## 2018-12-21 PROCEDURE — A9270 NON-COVERED ITEM OR SERVICE: HCPCS | Performed by: OBSTETRICS & GYNECOLOGY

## 2018-12-21 PROCEDURE — 86923 COMPATIBILITY TEST ELECTRIC: CPT

## 2018-12-21 PROCEDURE — G0378 HOSPITAL OBSERVATION PER HR: HCPCS

## 2018-12-21 PROCEDURE — 700105 HCHG RX REV CODE 258: Performed by: OBSTETRICS & GYNECOLOGY

## 2018-12-21 PROCEDURE — 700102 HCHG RX REV CODE 250 W/ 637 OVERRIDE(OP): Performed by: OBSTETRICS & GYNECOLOGY

## 2018-12-21 PROCEDURE — 36430 TRANSFUSION BLD/BLD COMPNT: CPT

## 2018-12-21 PROCEDURE — 80048 BASIC METABOLIC PNL TOTAL CA: CPT

## 2018-12-21 PROCEDURE — 36415 COLL VENOUS BLD VENIPUNCTURE: CPT

## 2018-12-21 RX ORDER — SODIUM CHLORIDE, SODIUM LACTATE, POTASSIUM CHLORIDE, CALCIUM CHLORIDE 600; 310; 30; 20 MG/100ML; MG/100ML; MG/100ML; MG/100ML
INJECTION, SOLUTION INTRAVENOUS CONTINUOUS
Status: DISCONTINUED | OUTPATIENT
Start: 2018-12-21 | End: 2018-12-21 | Stop reason: HOSPADM

## 2018-12-21 RX ORDER — ONDANSETRON 2 MG/ML
INJECTION INTRAMUSCULAR; INTRAVENOUS
Status: COMPLETED
Start: 2018-12-21 | End: 2018-12-21

## 2018-12-21 RX ORDER — SODIUM CHLORIDE 9 MG/ML
INJECTION, SOLUTION INTRAVENOUS CONTINUOUS
Status: DISCONTINUED | OUTPATIENT
Start: 2018-12-21 | End: 2018-12-21 | Stop reason: HOSPADM

## 2018-12-21 RX ADMIN — OXYCODONE AND ACETAMINOPHEN 1 TABLET: 5; 325 TABLET ORAL at 09:00

## 2018-12-21 RX ADMIN — OXYCODONE AND ACETAMINOPHEN 1 TABLET: 5; 325 TABLET ORAL at 13:45

## 2018-12-21 RX ADMIN — ONDANSETRON 4 MG: 2 INJECTION INTRAMUSCULAR; INTRAVENOUS at 00:34

## 2018-12-21 RX ADMIN — IBUPROFEN 600 MG: 600 TABLET, FILM COATED ORAL at 16:16

## 2018-12-21 RX ADMIN — SODIUM CHLORIDE 500 ML: 9 INJECTION, SOLUTION INTRAVENOUS at 11:17

## 2018-12-21 RX ADMIN — OXYCODONE AND ACETAMINOPHEN 1 TABLET: 5; 325 TABLET ORAL at 17:50

## 2018-12-21 RX ADMIN — SODIUM CHLORIDE, SODIUM LACTATE, POTASSIUM CHLORIDE, CALCIUM CHLORIDE: 600; 310; 30; 20 INJECTION, SOLUTION INTRAVENOUS at 00:34

## 2018-12-21 ASSESSMENT — PAIN SCALES - GENERAL
PAINLEVEL_OUTOF10: 10
PAINLEVEL_OUTOF10: 3
PAINLEVEL_OUTOF10: 2
PAINLEVEL_OUTOF10: 9
PAINLEVEL_OUTOF10: 2
PAINLEVEL_OUTOF10: 9
PAINLEVEL_OUTOF10: 8
PAINLEVEL_OUTOF10: 2
PAINLEVEL_OUTOF10: 10

## 2018-12-21 ASSESSMENT — LIFESTYLE VARIABLES: EVER_SMOKED: YES

## 2018-12-21 ASSESSMENT — PATIENT HEALTH QUESTIONNAIRE - PHQ9
2. FEELING DOWN, DEPRESSED, IRRITABLE, OR HOPELESS: NOT AT ALL
1. LITTLE INTEREST OR PLEASURE IN DOING THINGS: NOT AT ALL
SUM OF ALL RESPONSES TO PHQ9 QUESTIONS 1 AND 2: 0

## 2018-12-21 NOTE — CARE PLAN
Problem: Pain Management  Goal: Pain level will decrease to patient's comfort goal  Outcome: PROGRESSING AS EXPECTED  Refused pain medication.  Wanted to see if sleeping would help with pain

## 2018-12-21 NOTE — PROGRESS NOTES
Date: 2018    Requesting Physician:       Attending Physician: Kendall Carlos .M.D      CC: came with pain diarhea and vommiting..    HPI:  Delivered by c/s 2018. Not using any contraception brought through ambulance for fainting x 4.   diarhea and vommiting.   . LD- 2018 c/s x 3.  Smoker.        Social History     Social History   • Marital status: Legally      Spouse name: N/A   • Number of children: N/A   • Years of education: N/A     Occupational History   • Not on file.     Social History Main Topics   • Smoking status: Current Every Day Smoker     Packs/day: 1.00     Years: 10.00     Types: Cigarettes     Start date: 2015     Last attempt to quit: 1/15/2018   • Smokeless tobacco: Never Used   • Alcohol use No   • Drug use: No   • Sexual activity: Yes     Partners: Male      Comment: Unplanned pregnancy     Other Topics Concern   • Not on file     Social History Narrative   • No narrative on file       Active Ambulatory Problems     Diagnosis Date Noted   • History of  delivery affecting pregnancy 2018   • Trichomoniasis 05/15/2018   • Supervision of other high risk pregnancies, third trimester 05/15/2018   • PUPPP (pruritic urticarial papules and plaques of pregnancy) 2018   • S/P  2018     Resolved Ambulatory Problems     Diagnosis Date Noted   • No Resolved Ambulatory Problems     Past Medical History:   Diagnosis Date   • Inflammatory disease of ovary, fallopian tube, pelvic cellular tissue, and peritoneum        No current facility-administered medications on file prior to encounter.      Current Outpatient Prescriptions on File Prior to Encounter   Medication Sig Dispense Refill   • Prenatal MV-Min-Fe Fum-FA-DHA (PRENATAL 1 PO) Take  by mouth.         Past Surgical History:   Procedure Laterality Date   • REPEAT C SECTION  2018    Procedure: REPEAT C SECTION;  Surgeon: Erich Beltran M.D.;  Location: [facility];   Service: Labor and Delivery   • REPEAT C SECTION  2015    Performed by Erich Beltran M.D. at [facility]   • PRIMARY C SECTION  2012   • GYN SURGERY  2012               Allergic to Pcn [penicillins]    Review of Systems:   Constitutional: Negative for fever, chills, weight loss, malaise/fatigue and diaphoresis.   HENT: Negative for hearing loss, ear pain, nosebleeds, congestion, sore throat, neck pain, tinnitus and ear discharge.   Eyes: Negative for blurred vision, double vision, photophobia, pain, discharge and redness.   Respiratory: Negative for cough, hemoptysis, sputum production, shortness of breath, wheezing and stridor.   Cardiovascular: Negative for chest pain, palpitations, orthopnea, claudication, leg swelling and PND.   Gastrointestinal: per hpi   Genitourinary: Negative for dysuria, urgency, frequency, hematuria and flank pain.   Musculoskeletal: Negative for myalgias, back pain, joint pain and falls.   Skin: Negative for itching and rash.   Neurological: Negative for dizziness, tingling, tremors, sensory change, speech change, focal weakness, seizures, loss of consciousness, weakness and headaches.   Endo/Heme/Allergies: Negative for environmental allergies and polydipsia. Does not bruise/bleed easily.   Psychiatric/Behavioral: Negative for depression, suicidal ideas, hallucinations, memory loss and substance abuse. The patient is not nervous/anxious and does not have insomnia.   Physical Exam:     Vitals:    18 1910 18 1930 18   BP:       Pulse: 97 100 99 100   Resp:  (!) 24 (!) 21 20   Temp:       TempSrc:       SpO2: 100% 100% 99% 100%   Weight:       Height:           Constitutional: she is oriented to person, place, and time. she appears well-developed and well-nourished. Mild distress.   Head: Normocephalic and atraumatic.   Neck: Normal range of motion. Neck supple. No JVD present. No tracheal deviation present. No thyromegaly  present.   Cardiovascular: Normal rate, regular rhythm, normal heart sounds and intact distal pulses. Exam reveals no gallop and no friction rub. No murmur heard.   Pulmonary/Chest: Effort normal and breath sounds normal. No stridor. No respiratory distress. she has no wheezes. She has no rales.     Abdominal: Soft. There is no tenderness. There is no rebound and no guarding.    Pelvic exam: vaginal bleeding cervical motion tenderness+    Musculoskeletal: Normal range of motion. she exhibits no edema and no tenderness.   Neurological: she is alert and oriented to person, place, and time. she has normal reflexes. No cranial nerve deficit. Coordination normal.   Skin: Skin is warm and dry. No rash noted. She is diaphoretic. No erythema. No pallor.   Psychiatric: she has a normal mood and affect. Behavior is normal.   Labs:         Recent Labs          Radiology:      US- large amount of free fluid in pelvis and abdomen. No IUP seen.  Recent Labs      18   1744   WBC  19.3*   RBC  2.55*   HEMOGLOBIN  7.3*   HEMATOCRIT  22.3*   MCV  87.5   MCH  28.6   RDW  42.1   PLATELETCT  209   MPV  11.8   NEUTSPOLYS  89.60*   LYMPHOCYTES  6.90*   MONOCYTES  2.60   EOSINOPHILS  0.00   BASOPHILS  0.10         Assessment:     22 y/o    prior c/s x 3.   ruptured ectopic pregnancy.  Smoker.      Plan:    I talked to Pt  regarding the above and  recommended emergency surgery.    The above plan was reviewed thoroughly with the   patient, and the nurse. All questions were answered to   their satisfaction.   called OR.         Thank you very much for this consultation.

## 2018-12-21 NOTE — OP REPORT
DATE OF SERVICE:  2018    Patient is a 23-year-old  4, para 3-0-0-3 with the 3 prior C-sections,   last  done 6 months ago, came to the emergency room after 4 episodes   of fainting, severe abdominal pain, nausea, vomiting and diarrhea.  She was   low hypertensive.  Ultrasound repeated large amount of blood in the pelvis and   abdominal cavity and no intrauterine pregnancy.  Pregnancy bet-HCG was in the   30,000.    PREOPERATIVE DIAGNOSIS:  Ruptured ectopic with hemoperitoneum.    POSTOPERATIVE DIAGNOSIS:  Ruptured ectopic with hemoperitoneum.    PROCEDURE PERFORMED:  Laparoscopic right salpingectomy with control of   bleeding with drainage of hemoperitoneum.    ANESTHESIA:  General endotracheal tube.    ANESTHESIOLOGIST:  Ariel Bach MD    SURGEON:  Breanna Argueta MD    SPECIMEN:  Right tube with a ruptured ectopic and dilatation and curettage was   also done.  Endometrial curetting was sent for pathology.    INDICATIONS:  As discussed above.  During laparoscopy, uterus was noted to be   upper limits of normal with right fallopian tube distended at the infundibular   and with ruptured active bleeding noted.  Large amount of hemoperitoneum was   noted.  Left tube and ovary was normal.  Right ovary was normal.  Upper   abdomen, liver, gallbladder, spleen, omentum and peritoneal surface of the   bowel were all unremarkable in appearance.    PROCEDURE IN DETAIL:  Patient was taken to the operating room where general   anesthesia was induced without difficulty.  Patient was then placed in the   dorsal lithotomy position, exam under anesthesia, findings as noted above,   prepped and draped in a sterile fashion.  Bladder was Carbajal catheterized.  A   bivalve speculum was then placed in the patient's vagina and the anterior lip   of the cervix was grasped with single tooth tenaculum.  Cervix was serially   dilated.  Using a curette, endometrial cavity was curetted.  Specimen was sent   for  pathology.  Manoj manipulator was introduced and held in place for   manipulation.  Glove was removed, and after re-gloving, attention was directed   towards the abdominal portion of the procedure.  Infraumbilical skin was   infiltrated with Marcaine with epinephrine.  A 10 mm skin incision was made in   the umbilical fold.  Veress needle was carefully introduced into the   peritoneal cavity at a 45-degree angle while tenting the abdominal wall.    Intraperitoneal placement was confirmed by low pressure in the peritoneal   cavity.  Pneumoperitoneum was obtained with 4 liters of CO2 gas and a 10 mm   trocar and sleeve were then advanced without difficulty into the abdomen where   intraabdominal placement was confirmed by the laparoscope.  Abdomen was   explored and findings were as noted above.  Second suprapubic 2 mm port was   placed under direct visualization.  Then, the uterus was manipulated and right   tube was exposed, which was distended and bluish and also bleeding site was   noted.  Left tube and ovary was normal.  Right tube was held with Prestige and   exposed the infundibular, and using the Prestige, the mesosalpinx was   clamped, coagulated and cut.  The dissection clamping, coagulation and cut was   carried out parallel to the tube into the uterine cavity.  Then, the tube was   clamped, coagulated and cut.  Then, the scope was changed to a 5 scope and   was brought through the suprapubic port and a specimen retrieval bag was   introduced through the umbilical port under visualization.  The specimen was   brought into the bag, collected and sent for pathology.  Then, returned back   to the operative scope and pelvic cavity was copiously irrigated.  Fluid was   suctioned.  All blood clot was suctioned.  Three liters of fluid irrigation   was carried out into the belly and returning fluid was clear.  Hemostasis was   reaffirmed at the tubal site and the pelvic cavity and all the instruments   were  removed.  Umbilical port fascia was closed with 0 Vicryl.  The suprapubic   port fascia was closed with 0 Vicryl.  Skin of the ports was closed with 4-0   Monocryl.  Patient withstood the procedure well, was extubated and transferred   to the recovery room under stable condition.       ____________________________________     MD DEEPAK Nguyễn / TAZ    DD:  12/20/2018 22:32:07  DT:  12/21/2018 02:45:15    D#:  [id number]  Job#:  [id number]

## 2018-12-21 NOTE — OR NURSING
[id number] pt received from [facility]. Via [employer] with SR^x2 report from Or staff Surgeon to bedside shortly after arrival. poc stated. Pt awaken shortly after arrival and disoriented appropriately. Shivering and removing blankets. Medicated with rx'd demerol for shivering with good results. 2 visible dsgs to puncture sites. coverlettes cd&i.     poc reviewed with pt. Pt declined pain medication including non narcotic at this time.     F/C removed uneventfully. Pt oob to bathroom and steady. Stated that preop dizzyness has resloved.     Meets transfer criteria call to [facility] Lee and charge Rn on CDU. Pt escorted to room and staff updated.

## 2018-12-21 NOTE — PROGRESS NOTES
Spoke with MD to update no pt status. Per MD, ok to DC home at this time. Pt updated, awaiting transport home

## 2018-12-21 NOTE — OR SURGEON
Immediate Post OP Note    PreOp Diagnosis: ruptured ectopic pregnancy.    PostOp Diagnosis: same.    Procedure(s):  DIAGNOSTIC LAPAROSCOPY, DRAINAGE OF HEMOPERITONEUM - Wound Class: Clean  SALPINGECTOMY - Wound Class: Clean  DILATION AND CURETTAGE - Wound Class: Clean    Surgeon(s):  Breanna Argueta M.D.    Anesthesiologist/Type of Anesthesia:  Anesthesiologist: Ariel Bach M.D.  Anesthesia Technician: Logan Blount/General    Surgical Staff:  Circulator: Jeremie Gleason R.N.; Andres Matos RRADHA; Coby Aaron R.N.  Scrub Person: Rosa Walker; Leopold Von C Garcia    Specimens removed if any:  ID Type Source Tests Collected by Time Destination   A : RIGHT FALLOPIAN TUBE AND ECTOPIC Tissue Fallopian Tube PATHOLOGY SPECIMEN Breanna Argueta M.D. 12/20/2018  9:58 PM    B : DILATATION AND CURETTAGE Tissue Vaginal PATHOLOGY SPECIMEN Breanna Argueta M.D. 12/20/2018  9:59 PM        Estimated Blood Loss: minimal during surgery but 1500ml of blood clots drained.    Findings: hemoperitoneum.   right tube enlarged blue and ruptured. Left tube and ovary normal.    Complications: none.        12/20/2018 10:25 PM Breanna Argueta M.D.

## 2018-12-21 NOTE — PROGRESS NOTES
First 15 mins of PRBC transfusion completed. No s/sx of transfusion reaction. Will continue to monitor

## 2018-12-21 NOTE — ED TRIAGE NOTES
".  Chief Complaint   Patient presents with   • Nausea/Vomiting/Diarrhea     since last night, reports blood in vomit   • Abdominal Pain   • Painful Urination     with foul smelling urine x 2 months     .BP (!) 99/68   Pulse (!) 111   Temp 36.2 °C (97.2 °F) (Temporal)   Resp 16   Ht 1.803 m (5' 11\")   Wt 79 kg (174 lb 2.6 oz)   Breastfeeding? Unknown   BMI 24.29 kg/m²     BIB EMS with above complaints, PIV in place on arrival, 1L IVF and 4 mg PO Zofran given PTA, chart up for ERP.    "

## 2018-12-21 NOTE — PROGRESS NOTES
PRBC transfusion stopped. No s/sx of transfusion reaction. Pt ambulated to BR with SBA, steady gait noted. Voided without difficulty. Reports 10/10 abdominal pain, medicated per MAR

## 2018-12-21 NOTE — OR NURSING
2350 dent d/c'd pt oob ambulatory to br. +void. Nausea and vomit 250cc. Medicated with 4 mg zofran iv with good results.

## 2018-12-21 NOTE — ED PROVIDER NOTES
"ED Provider Note    Scribed for Nayely Kelly M.D. by Nicko Quinones. 2018  6:22 PM    Means of arrival: Ambulance  History obtained from: Patient  History limited by: None    CHIEF COMPLAINT  Chief Complaint   Patient presents with   • Nausea/Vomiting/Diarrhea     since last night, reports blood in vomit   • Abdominal Pain   • Painful Urination     with foul smelling urine x 2 months     HPI  Natalie Pop is a 23 y.o. female who presents to the Emergency Department for evaluation of severe generalized abdominal pain onset last night. The patient reports that the pain started right when she got home from work. The patient notes that the pain radiates into her lower back. Per patient, she has been experiencing associated nausea, vomiting, diarrhea, chills, shortness of breath, and dysuria. The patient states that she has been experiencing several episodes of vomiting and diarrhea last night and adds that she has found some blood in her vomit but not in her stool. She also notes that the dysuria has been going on for about 2 months. She also endorses experiencing \"white milky vaginal discharge that smells like potatoes\", which she adds is normal for her baseline. Her pregnancy history is . She recently delivered her last child 6 months ago with the pregnancy center. The patient denies experiencing fever, chest pain, coughing, and blood in urine.    REVIEW OF SYSTEMS  Pertinent positive include abdominal pain, back pain, nausea, vomiting, diarrhea, chills, shortness of breath, dysuria, vaginal discharge, blood in vomit. Pertinent negative include no blood in stool, fever, chest pain, coughing, and blood in urine. All other systems reviewed and are negative.    PAST MEDICAL HISTORY   has a past medical history of Inflammatory disease of ovary, fallopian tube, pelvic cellular tissue, and peritoneum.    SOCIAL HISTORY  Social History     Social History Main Topics   • Smoking status: " "Current Every Day Smoker     Packs/day: 1.00     Years: 10.00     Types: Cigarettes     Start date: 1/13/2015     Last attempt to quit: 1/15/2018   • Smokeless tobacco: Never Used   • Alcohol use No   • Drug use: No   • Sexual activity: Yes     Partners: Male      Comment: Unplanned pregnancy       SURGICAL HISTORY   has a past surgical history that includes gyn surgery (2012); repeat c section (4/13/2015); primary c section (11/04/2012); and repeat c section (5/31/2018).    CURRENT MEDICATIONS  Home Medications     Reviewed by Sandra Jhaveri (Pharmacy Tech) on 12/20/18 at 2037  Med List Status: Complete   Medication Last Dose Status        Patient Stanley Taking any Medications                       ALLERGIES  Allergies   Allergen Reactions   • Pcn [Penicillins] Hives       PHYSICAL EXAM   VITAL SIGNS: /60   Pulse (!) 120   Temp 36.2 °C (97.2 °F) (Temporal)   Resp 18   Ht 1.803 m (5' 11\")   Wt 79 kg (174 lb 2.6 oz)   SpO2 100%   Breastfeeding? Unknown   BMI 24.29 kg/m²      Constitutional: Ill appearing young female. Alert in moderate distress.  HENT: Normocephalic, Atraumatic. Bilateral external ears normal. Nose normal.  Dry mucous membranes.  Oropharynx clear.  Eyes: Pupils are equal and reactive. Conjunctiva normal.   Neck: Supple, full range of motion  Heart: Tachycardia. Normal rhythm.  No murmurs.    Lungs: No respiratory distress, normal work of breathing. Lungs clear to auscultation bilaterally.  Abdomen Diffuse tenderness to palpation. Abdominal distension. Voluntary guarding.  Musculoskeletal: Atraumatic. No obvious deformities noted.  No lower extremity edema.  Skin: Warm, Dry.  No erythema, No rash.   Neurologic: Alert and oriented x3. Moving all extremities spontaneously without focal deficits.  Psychiatric: Affect normal, Mood normal, Appears appropriate and not intoxicated.    DIAGNOSTIC STUDIES    LABS  Personally reviewed by me  Labs Reviewed   CBC WITH DIFFERENTIAL - Abnormal; " "Notable for the following:        Result Value    WBC 19.3 (*)     RBC 2.55 (*)     Hemoglobin 7.3 (*)     Hematocrit 22.3 (*)     MCHC 32.7 (*)     Neutrophils-Polys 89.60 (*)     Lymphocytes 6.90 (*)     Neutrophils (Absolute) 17.32 (*)     Immature Granulocytes (abs) 0.16 (*)     All other components within normal limits   COMP METABOLIC PANEL - Abnormal; Notable for the following:     Co2 14 (*)     Anion Gap 14.0 (*)     Glucose 127 (*)     Bun 27 (*)     Creatinine 2.38 (*)     Calcium 8.0 (*)     Total Protein 5.6 (*)     All other components within normal limits   LIPASE - Abnormal; Notable for the following:     Lipase 6 (*)     All other components within normal limits   HCG QUAL SERUM - Abnormal; Notable for the following:     Beta-Hcg Qualitative Serum Positive (*)     All other components within normal limits   ESTIMATED GFR - Abnormal; Notable for the following:     GFR If  30 (*)     GFR If Non  25 (*)     All other components within normal limits   LACTIC ACID - Abnormal; Notable for the following:     Lactic Acid 2.6 (*)     All other components within normal limits   HCG QUANTITATIVE - Abnormal; Notable for the following:     Bhcg 05913.3 (*)     All other components within normal limits   PROTHROMBIN TIME - Abnormal; Notable for the following:     PT 15.6 (*)     INR 1.23 (*)     All other components within normal limits    Narrative:     Indicate which anticoagulants the patient is on:->UNKNOWN   BLOOD CULTURE    Narrative:     Per Hospital Policy: Only change Specimen Src: to \"Line\" if  specified by physician order.   BLOOD CULTURE    Narrative:     Per Hospital Policy: Only change Specimen Src: to \"Line\" if  specified by physician order.   COD (ADULT)   APTT    Narrative:     Indicate which anticoagulants the patient is on:->UNKNOWN   URINALYSIS,CULTURE IF INDICATED   WET PREP   CHLAMYDIA/GC PCR URINE OR SWAB   BASIC METABOLIC PANEL   LACTIC ACID   CBC WITH " DIFFERENTIAL   TRANSFUSE RED BLOOD CELLS-NURSING COMMUNICATION   TRANSFUSE RED BLOOD CELLS-NURSING COMMUNICATION       RADIOLOGY  Personally reviewed by me  US-OB 1ST TRIMESTER WITH TRANSVAGINAL (COMBO)   Final Result      1.  No evidence of intrauterine pregnancy.      2.  Large amount of free peritoneal fluid containing internal debris. Differential diagnosis does include ruptured ectopic pregnancy. Ruptured ovarian cyst is also in the differential diagnosis.      US-RUQ    (Results Pending)   US-APPENDIX    (Results Pending)     ED COURSE  Vitals:    12/20/18 1910 12/20/18 1930 12/20/18 2010 12/20/18 2020   BP:       Pulse: 97 100 99 100   Resp:  (!) 24 (!) 21 20   Temp:       TempSrc:       SpO2: 100% 100% 99% 100%   Weight:       Height:             Medications administered:  Medications   NS infusion 1,000 mL (0 mL Intravenous Stopped 12/20/18 1909)   NS infusion 1,000 mL (1,000 mL Intravenous New Bag 12/20/18 1900)   acetaminophen (TYLENOL) tablet 975 mg (975 mg Oral Given 12/20/18 1900)   ondansetron (ZOFRAN) syringe/vial injection 4 mg (4 mg Intravenous Given 12/20/18 1900)     Patient was given IV fluids for clinical signs of dehydration.  IV hydration was used because oral hydration was not adequate alone.  Following fluid administration patient's symptoms were improved.    6:22 PM Patient seen and examined at bedside. The patient presents with abdominal pain. Ordered for US-RUQ, US-Appendix, US-OB Transvaginal, HCG Quantitative Serum, Wet Prep, Chlamydia and GC by PCR, Lactic Acid, Blood Culture, CBC, CMP, Lipase, HCG Qual Serum, UA, and Estimated GFR to evaluate. Patient will be treated with Tylenol 975 mg and Zofran 4 mg for her symptoms.     MEDICAL DECISION MAKING  Young otherwise healthy patient presents with 24-hour history of abdominal pain, vomiting and diarrhea.  She is mildly tachycardic and hypotensive on arrival, afebrile with otherwise normal vital signs.  Patient is ill-appearing with  diffuse abdominal pain and distention.  Pregnancy test is positive.  Patient likely early in pregnancy as her last delivery was 6 months prior.  Bedside ultrasound performed without visualized intrauterine pregnancy and obvious free fluid throughout the abdomen therefore ruptured ectopic pregnancy is the highest on my differential.  Labs demonstrate significant leukocytosis as well as elevated lactate.  Patient was fluid resuscitated appropriately.  She also has significant anemia therefore transfusion orders were initiated.  LFTs and lipase are normal without concern for cholecystitis or pancreatitis.  Ruptured appendicitis is also a possibility however less likely with the large amount of fluid in the abdomen.    7:06 PM Patient reevaluated at bedside. I visualized the bedside ultrasound with the ultrasound technician present in the room. I witnessed free fluid and blood in the pelvis extending up to the liver. No IUP identified. I will consult OB/Gyn with concern for ruptured ectopic pregnancy.     7:41PM Consult with Dr. Argueta (OB/GYN). She is now aware of the patient and my concern for a ruptured ectopic pregnancy. She wants to see the HCG Quantitative Serum and Ultrasound results prior to making any decisions on patient management.     8:31 PM Dr. Argueta at bedside evaluating the patient.  She will take her to the operating room for laparoscopy due to presumed ectopic pregnancy.     Blood transfusion consent  I have explained to the patient the risks and benefits of transfusion of blood products.  This includes, as appropriate, the risk of mild allergic reaction, hemolytic reaction, transfusion-associated lung injury, febrile reactions, circulatory or iron overload, and infection.    We discussed possible alternatives and their risks, including directed donation, autologous transfusion, and no transfusion, including IV or oral iron supplementation, as appropriate.  I believe the patient understands  the risks and benefits and was able to express understanding.    CRITICAL CARE TIME  Upon my evaluation, this patient had a high probability of imminent or life-threatening deterioration due to ruptured ectopic pregnancy causing lactic acidosis, acute renal failure, acute blood loss anemia requiring transfusion which required my direct attention, intervention, and personal management.     I personally provided 45 minutes of total critical care time outside of time spent on separately billable/documented procedures. Time includes: review of laboratory data, review of radiology studies, discussion with consultants, discussion with family/patient, monitoring for potential decompensation.  Interventions were performed as documented above.     DISPOSITION:  Patient will be admitted to Dr. Mcnair in critical condition.    IMPRESSION  (O00.90) Ruptured ectopic pregnancy  (E87.2) Lactic acidosis  (N17.9) MAKENZIE (acute kidney injury) (HCC)  (D64.9) Anemia, unspecified type     I personally provided 45 minutes of total critical care time outside of time spent on separately billable/documented procedures. Time includes: review of laboratory data, review of radiology studies, discussion with consultants, discussion with family/patient, monitoring for potential decompensation.  Interventions were performed as documented above.     Results, diagnoses, and treatment options were discussed with the patient and/or family. Patient verbalized understanding of plan of care.     Nicko MARTIN (Scribe), am scribing for, and in the presence of, Nayely Kelly M.D..    Electronically signed by: Nicko Quinones (Scribe), 12/20/2018    Nayely MARTIN M.D. personally performed the services described in this documentation, as scribed by Nicko Quinones in my presence, and it is both accurate and complete. C.     The note accurately reflects work and decisions made by me.  Nayely Kelly  12/21/2018  12:27 AM

## 2018-12-21 NOTE — PROGRESS NOTES
Admission assessment complete.  Pt refusing pain medication at this time, requested to sleep instead of taking meds.  No distress noted.  Bandage intact.  Educated pt on plan for the day.  No distress noted.  Will monitor

## 2018-12-21 NOTE — DISCHARGE INSTRUCTIONS
Discharge Instructions    Discharged to home by car with relative. Discharged via wheelchair, hospital escort: Yes.  Special equipment needed: Not Applicable    Be sure to schedule a follow-up appointment with your primary care doctor or any specialists as instructed.     Discharge Plan:   Diet Plan: Discussed  Activity Level: Discussed  Smoking Cessation Offered: Patient Refused  Confirmed Follow up Appointment: Patient to Call and Schedule Appointment  Confirmed Symptoms Management: Discussed  Medication Reconciliation Updated: Yes  Influenza Vaccine Indication: Patient Refuses    I understand that a diet low in cholesterol, fat, and sodium is recommended for good health. Unless I have been given specific instructions below for another diet, I accept this instruction as my diet prescription.   Other diet: Heart healthy    Special Instructions: None    · Is patient discharged on Warfarin / Coumadin?   No     Depression / Suicide Risk    As you are discharged from this [facility] facility, it is important to learn how to keep safe from harming yourself.    Recognize the warning signs:  · Abrupt changes in personality, positive or negative- including increase in energy   · Giving away possessions  · Change in eating patterns- significant weight changes-  positive or negative  · Change in sleeping patterns- unable to sleep or sleeping all the time   · Unwillingness or inability to communicate  · Depression  · Unusual sadness, discouragement and loneliness  · Talk of wanting to die  · Neglect of personal appearance   · Rebelliousness- reckless behavior  · Withdrawal from people/activities they love  · Confusion- inability to concentrate     If you or a loved one observes any of these behaviors or has concerns about self-harm, here's what you can do:  · Talk about it- your feelings and reasons for harming yourself  · Remove any means that you might use to hurt yourself (examples: pills, rope, extension cords,  firearm)  · Get professional help from the community (Mental Health, Substance Abuse, psychological counseling)  · Do not be alone:Call your Safe Contact- someone whom you trust who will be there for you.  · Call your local CRISIS HOTLINE 586-6217 or 144-293-2879  · Call your local Children's Mobile Crisis Response Team [place] (893) 210-8224 or www.Krux  · Call the toll free National Suicide Prevention Hotlines   · National Suicide Prevention Lifeline 534-482-XGVV (7459)  · [place] MD Line Network 800-SUICIDE (926-1116)      Salpingectomy  Salpingectomy, also called tubectomy, is the surgical removal of one of the fallopian tubes. The fallopian tubes are where eggs travel from the ovaries to the uterus. Removing one fallopian tube does not prevent you from becoming pregnant. It also does not cause problems with your menstrual periods. You may need a salpingectomy if you:  · Have a fertilized egg that attaches to the fallopian tube (ectopic pregnancy), especially one that causes the tube to burst or tear (rupture).  · Have an infected fallopian tube.  · Have cancer of the fallopian tube or nearby organs.  · Have had an ovary removed due to a cyst or tumor.  · Have had your uterus removed.  There are three different methods that can be used for a salpingectomy:  · Open. This method involves making one large incision in your abdomen.  · Laparoscopic. This method involves using a thin, lighted tube with a tiny camera on the end (laparoscope) to help perform the procedure. The laparoscope will allow your surgeon to make several small incisions in the abdomen instead of a large incision.  · Robot-assisted: This method involves using a computer to control surgical instruments that are attached to robotic arms.  Tell a health care provider about:  · Any allergies you have.  · All medicines you are taking, including vitamins, herbs, eye drops, creams, and over-the-counter medicines.  · Any problems you  or family members have had with anesthetic medicines.  · Any blood disorders you have.  · Any surgeries you have had.  · Any medical conditions you have.  · Whether you are pregnant or may be pregnant.  What are the risks?  Generally, this is a safe procedure. However, problems may occur, including:  · Infection.  · Bleeding.  · Allergic reactions to medicines.  · Damage to other structures or organs.  · Blood clots in the legs or lungs.  What happens before the procedure?  Staying hydrated   Follow instructions from your health care provider about hydration, which may include:  · Up to 2 hours before the procedure - you may continue to drink clear liquids, such as water, clear fruit juice, black coffee, and plain tea.  Eating and drinking restrictions   Follow instructions from your health care provider about eating and drinking, which may include:  · 8 hours before the procedure - stop eating heavy meals or foods such as meat, fried foods, or fatty foods.  · 6 hours before the procedure - stop eating light meals or foods, such as toast or cereal.  · 6 hours before the procedure - stop drinking milk or drinks that contain milk.  · 2 hours before the procedure - stop drinking clear liquids.  Medicines  · Ask your health care provider about:  ¨ Changing or stopping your regular medicines. This is especially important if you are taking diabetes medicines or blood thinners.  ¨ Taking medicines such as aspirin and ibuprofen. These medicines can thin your blood. Do not take these medicines before your procedure if your health care provider instructs you not to.  · You may be given antibiotic medicine to help prevent infection.  General instructions  · Do not smoke for at least 2 weeks before your procedure. If you need help quitting, ask your health care provider.  · You may have an exam or tests, such as an electrocardiogram (ECG).  · You may have a blood or urine sample taken.  · Ask your health care  provider:  ¨ Whether you should stop removing hair from your surgical area.  ¨ How your surgical site will be marked or identified.  · You may be asked to shower with a germ-killing soap.  · Plan to have someone take you home from the hospital or clinic.  · If you will be going home right after the procedure, plan to have someone with you for 24 hours.  What happens during the procedure?  · To reduce your risk of infection:  ¨ Your health care team will wash or sanitize their hands.  ¨ Hair may be removed from the surgical area.  ¨ Your skin will be washed with soap.  · An IV tube will be inserted into one of your veins.  · You will be given a medicine to make you fall asleep (general anesthetic). You may also be given a medicine to help you relax (sedative).  · A thin tube (catheter) may be inserted through your urethra and into your bladder to drain urine during your procedure.  · Depending on the type of procedure you are having, one incision or several small incisions will be made in your abdomen.  · Your fallopian tube will be cut and removed from where it attaches to your uterus.  · Your blood vessels will be clamped and tied to prevent excess bleeding.  · The incision(s) in your abdomen will be closed with stitches (sutures), staples, or skin glue.  · A bandage (dressing) may be placed over your incision(s).  The procedure may vary among health care providers and hospitals.  What happens after the procedure?  · Your blood pressure, heart rate, breathing rate, and blood oxygen level will be monitored until the medicines you were given have worn off.  · You may continue to receive fluids and medicines through an IV tube.  · You may continue to have a catheter draining your urine.  · You may have to wear compression stockings. These stockings help to prevent blood clots and reduce swelling in your legs.  · You will be given pain medicine as needed.  · Do not drive for 24 hours if you received a  sedative.  Summary  · Salpingectomy is a surgical procedure to remove one of the fallopian tubes.  · The procedure may be done with an open incision, with a laparoscope, or with computer-controlled instruments.  · Depending on the type of procedure you are having, one incision or several small incisions will be made in your abdomen.  · Your blood pressure, heart rate, breathing rate, and blood oxygen level will be monitored until the medicines you were given have worn off.  · Plan to have someone take you home from the hospital or clinic.  This information is not intended to replace advice given to you by your health care provider. Make sure you discuss any questions you have with your health care provider.  Document Released: 05/06/2010 Document Revised: 08/04/2017 Document Reviewed: 06/11/2014  [employer] Interactive Patient Education © 2017 [employer]      Ruptured Ectopic Pregnancy  An ectopic pregnancy is when the fertilized egg attaches (implants) outside the uterus. Most ectopic pregnancies occur in the fallopian tube. Rarely do ectopic pregnancies occur on the ovary, intestine, pelvis, or cervix. An ectopic pregnancy does not have the ability to develop into a normal, healthy baby.  A ruptured ectopic pregnancy is one in which the fallopian tube gets torn or bursts and results in internal bleeding. Often there is intense abdominal pain, and sometimes, vaginal bleeding. Having an ectopic pregnancy can be a life-threatening experience. If left untreated, this dangerous condition can lead to a blood transfusion, abdominal surgery, or even death.  What are the causes?  Damage to the fallopian tubes is the suspected cause in most ectopic pregnancies.  What increases the risk?  Depending on your circumstances, the amount of risk of having an ectopic pregnancy will vary. There are 3 categories that may help you identify whether you are potentially at risk.  High Risk  · You have gone through infertility  treatment.  · You have had a previous ectopic pregnancy.  · You have had previous tubal surgery.  · You have had previous surgery to have the fallopian tubes tied (tubal ligation).  · You have tubal problems or diseases.  · You have been exposed to FLORA. FLORA is a medicine that was used until 1971 and had effects on babies whose mothers took the medicine.  · You become pregnant while using an intrauterine device (IUD) for birth control.  Moderate Risk  · You have a history of infertility.  · You have a history of a sexually transmitted infection (STI).  · You have a history of pelvic inflammatory disease (PID).  · You have scarring from endometriosis.  · You have multiple sexual partners.  · You smoke.  Low Risk  · You have had previous pelvic surgery.  · You use vaginal douching.  · You became sexually active before 18 years of age.  What are the signs or symptoms?  An ectopic pregnancy should be suspected in anyone who has missed a period and has abdominal pain or bleeding.  · You may experience normal pregnancy symptoms, such as:  ¨ Nausea.  ¨ Tiredness.  ¨ Breast tenderness.  · Symptoms that are not normal include:  ¨ Pain with intercourse.  ¨ Irregular vaginal bleeding or spotting.  ¨ Cramping or pain on one side, or in the lower abdomen.  ¨ Fast heartbeat.  ¨ Passing out while having a bowel movement.  · Symptoms of a ruptured ectopic pregnancy and internal bleeding may include:  ¨ Sudden, severe pain in the abdomen and pelvis.  ¨ Dizziness or fainting.  ¨ Pain in the shoulder area.  How is this diagnosed?  Tests that may be performed include:  · A pregnancy test.  · An ultrasound.  · Testing the specific level of pregnancy hormone in the bloodstream.  · Taking a sample of uterus tissue (dilation and curettage, D&C).  · Surgery to perform a visual exam of the inside of the abdomen using a lighted tube (laparoscopy).  How is this treated?  Laparoscopic surgery or abdominal surgery is recommended for a ruptured  ectopic pregnancy.  · The whole fallopian tube may need to be removed (salpingectomy).  · If the tube is not too damaged, the tube may be saved, and the pregnancy will be surgically removed. In time, the tube may still function.  · If you have lost a lot of blood, you may need a blood transfusion.  · You may receive a Rho (D) immune globulin shot if you are Rh negative and the father is Rh positive, or if you do not know the Rh type of the father. This is to prevent problems with any future pregnancy.  Get help right away if:  You have any symptoms of an ectopic or ruptured ectopic pregnancy. This is a medical emergency.  This information is not intended to replace advice given to you by your health care provider. Make sure you discuss any questions you have with your health care provider.  Document Released: 12/15/2001 Document Revised: 07/07/2017 Document Reviewed: 09/29/2014  [employer] Interactive Patient Education © 2017 [employer]

## 2018-12-21 NOTE — PROGRESS NOTES
Assumed pt care at 0700. Received report from Rosa TURNER. A&O x4. Pt reports 10/10 abdominal pain, medicated per MAR. Respirations even and unlabored on RA. Pt voiding, tolerating oral fluids, pain controlled with PRN medications. 2x dressings in place, CDI   Updated on POC, communication board updated. Bed locked and in lowest position. Call light and belongings within reach. Non-skid socks in place. Needs met, will continue to monitor.

## 2018-12-22 NOTE — PROGRESS NOTES
Pt reports 8/10 abdominal pain, medicated per MAR. Per pt, ride will be here at 6pm. Charge RN updated

## 2018-12-22 NOTE — PROGRESS NOTES
Pt DC'd. IV removed, discharge instructions provided to patient, pt verbalizes understanding. Pt provided with 3 paper prescriptions. Pt states all questions have been answered. Copy of discharge paperwork provided to pt, signed copy in chart, along with signed consent for opiate prescription. Pt states all belongings in possession. Pt escorted off unit by  without incident.

## 2018-12-25 LAB
BACTERIA BLD CULT: NORMAL
BACTERIA BLD CULT: NORMAL
SIGNIFICANT IND 70042: NORMAL
SIGNIFICANT IND 70042: NORMAL
SITE SITE: NORMAL
SITE SITE: NORMAL
SOURCE SOURCE: NORMAL
SOURCE SOURCE: NORMAL

## 2019-09-16 ENCOUNTER — HOSPITAL ENCOUNTER (EMERGENCY)
Facility: MEDICAL CENTER | Age: 24
End: 2019-09-16
Attending: EMERGENCY MEDICINE
Payer: MEDICAID

## 2019-09-16 VITALS
TEMPERATURE: 97.7 F | HEART RATE: 61 BPM | SYSTOLIC BLOOD PRESSURE: 110 MMHG | BODY MASS INDEX: 24.49 KG/M2 | OXYGEN SATURATION: 100 % | HEIGHT: 72 IN | WEIGHT: 180.78 LBS | RESPIRATION RATE: 16 BRPM | DIASTOLIC BLOOD PRESSURE: 71 MMHG

## 2019-09-16 DIAGNOSIS — J02.9 PHARYNGITIS, UNSPECIFIED ETIOLOGY: ICD-10-CM

## 2019-09-16 LAB — S PYO DNA SPEC NAA+PROBE: DETECTED

## 2019-09-16 PROCEDURE — 99284 EMERGENCY DEPT VISIT MOD MDM: CPT

## 2019-09-16 PROCEDURE — 700102 HCHG RX REV CODE 250 W/ 637 OVERRIDE(OP): Performed by: EMERGENCY MEDICINE

## 2019-09-16 PROCEDURE — A9270 NON-COVERED ITEM OR SERVICE: HCPCS | Performed by: EMERGENCY MEDICINE

## 2019-09-16 PROCEDURE — 87651 STREP A DNA AMP PROBE: CPT

## 2019-09-16 RX ORDER — AZITHROMYCIN 250 MG/1
500 TABLET, FILM COATED ORAL DAILY
Qty: 4 TAB | Refills: 0 | Status: SHIPPED | OUTPATIENT
Start: 2019-09-16 | End: 2020-05-19

## 2019-09-16 RX ORDER — AZITHROMYCIN 250 MG/1
500 TABLET, FILM COATED ORAL ONCE
Status: COMPLETED | OUTPATIENT
Start: 2019-09-16 | End: 2019-09-16

## 2019-09-16 RX ADMIN — AZITHROMYCIN 500 MG: 250 TABLET, FILM COATED ORAL at 18:50

## 2019-09-16 NOTE — ED TRIAGE NOTES
Chief Complaint   Patient presents with   • Sore Throat     x2 days, tonsils enlarged, no sores observed.    • Ear Pain     RT ear pain today    • Body Aches     Pt to triage for above. Reports possible fever last night.     Pt returned to lobby. Educated on triage process and to inform staff of any changes.     /64   Pulse 78   Temp 36.5 °C (97.7 °F) (Temporal)   Resp 18   Ht 1.829 m (6')   Wt 82 kg (180 lb 12.4 oz)   SpO2 97%   BMI 24.52 kg/m²

## 2019-09-17 NOTE — ED PROVIDER NOTES
ER Provider Note     Scribed for Andrade Phillips M.D. by Adeola Peacock. 2019, 5:11 PM.    Primary Care Provider: None noted  Means of Arrival: Walk-in   History obtained from: Patient  History limited by: None     CHIEF COMPLAINT  Chief Complaint   Patient presents with   • Sore Throat     x2 days, tonsils enlarged, no sores observed.    • Ear Pain     RT ear pain today    • Body Aches       HPI  Natalie Pop is a 24 y.o. female who presents to the Emergency Department for evaluation of an acute, constant sore throat onset two days ago with worsening severity today prompting her to visit the ED for further evaluation of her condition. The patient endorses symptoms of enlarged tonsils, right ear pain, right lower dental pain, subjective fevers, cough and generalized body aches since the onset of her symptoms. Exacerbating factors for the patient's ear pain include sneezing. No alleviating factors were reported. She does not report taking any over the counter medications for her current symptoms. Negative redness or swelling to the gums. The patient is allergic to Penicillin.       REVIEW OF SYSTEMS  See HPI for further details.     PAST MEDICAL HISTORY   has a past medical history of Inflammatory disease of ovary, fallopian tube, pelvic cellular tissue, and peritoneum.    SURGICAL HISTORY   has a past surgical history that includes gyn surgery (); repeat c section (2015); primary c section (2012); repeat c section (2018); pelviscopy (2018); salpingectomy (Right, 2018); and dilation and curettage (2018).    SOCIAL HISTORY  Social History     Tobacco Use   • Smoking status: Current Every Day Smoker     Packs/day: 1.00     Years: 10.00     Pack years: 10.00     Types: Cigarettes     Start date: 2015     Last attempt to quit: 1/15/2018     Years since quittin.6   • Smokeless tobacco: Never Used   Substance Use Topics   • Alcohol use: No   • Drug use: No       Social History     Substance and Sexual Activity   Drug Use No       FAMILY HISTORY  Family History   Problem Relation Age of Onset   • No Known Problems Paternal Grandmother        CURRENT MEDICATIONS  Home Medications     Reviewed by Riccardo De La Torre R.N. (Registered Nurse) on 09/16/19 at 1659  Med List Status: Partial   Medication Last Dose Status   ibuprofen (MOTRIN) 600 MG Tab  Active   ondansetron (ZOFRAN ODT) 8 MG TABLET DISPERSIBLE  Active                ALLERGIES  Allergies   Allergen Reactions   • Pcn [Penicillins] Hives       PHYSICAL EXAM  VITAL SIGNS: /64   Pulse 78   Temp 36.5 °C (97.7 °F) (Temporal)   Resp 18   Ht 1.829 m (6')   Wt 82 kg (180 lb 12.4 oz)   SpO2 97%   BMI 24.52 kg/m²      Constitutional: Alert in no apparent distress.  HENT: No signs of trauma, Bilateral external ears normal, Nose normal. Bilateral swollen tonsils. [place] tooth projecting out of the back right jaw. No redness or swelling of gums.   Eyes: Pupils are equal and reactive, Conjunctiva normal, Non-icteric.   Neck: Normal range of motion, No tenderness, Supple, No stridor.   Lymphatic: No lymphadenopathy noted.   Thorax & Lungs: Normal breath sounds, No respiratory distress, No wheezing, No stridor. No chest tenderness.   Skin: Warm, Dry, No erythema, No rash.   Back: No bony tenderness, No CVA tenderness.   Extremities: Intact distal pulses, No edema, No tenderness, No cyanosis.  Musculoskeletal: Good range of motion in all major joints. No tenderness to palpation or major deformities noted.   Neurologic: Alert , Normal motor function, Normal sensory function, No focal deficits noted.   Psychiatric: Affect normal, Judgment normal, Mood normal.       DIAGNOSTIC STUDIES / PROCEDURES    LABS  Labs Reviewed   GROUP A STREP BY PCR - Abnormal; Notable for the following components:       Result Value    Group A Strep by PCR DETECTED (*)     All other components within normal limits     All labs reviewed by  me.      COURSE & MEDICAL DECISION MAKING  Pertinent Labs & Imaging studies reviewed. (See chart for details)    This is a 24 y.o. female that presents with what appears to be a viral syndrome and some concern for strep throat.  She has no evidence of deep space infection.  She has no evidence of Marcin's angina.  She is no stridor or impending airway compromise.  I will get a strep swab and then reassess..     5:11 PM - Patient seen and examined at bedside. Discussed plan of care with patient which includes obtaining a strep test. If the strep test is positive, the patient will be discharged home with antibiotics. Patient verbalizes her understanding and agreement to the plan of care. Ordered Group A strep by PCR.      6:31 PM Reviewed patient's lab results. The patient has a positive strep test.     6:39 PM Recheck. The patient was informed that her strep test was positive. The patient is stable for discharge. She was given discharge instructions which include taking her antibiotics as prescribed and following up with a primary care physician. The patient will be discharged with a prescription for Zithromax 250 mg which is to be taken twice daily. She was given a referral to [facility] and instructed to immediately return to the ED if her symptoms worsen. Patient verbalizes their understanding and agreement to plan of discharge.     The patient will return for new or worsening symptoms and is stable at the time of discharge.    DISPOSITION:  Patient will be discharged home in stable condition.    FOLLOW UP:  [facility]  [address] [address]  [phone]  Go in 2 days        OUTPATIENT MEDICATIONS:  Discharge Medication List as of 9/16/2019  6:44 PM      START taking these medications    Details   azithromycin (ZITHROMAX) 250 MG Tab Take 2 Tabs by mouth every day., Disp-4 Tab, R-0, Normal             FINAL IMPRESSION  1. Pharyngitis, unspecified etiology          Adeola MARTIN  Ayush (Scribe), am scribing for, and in the presence of, Andrade Phillips M.D..    Electronically signed by: Adeola Peacock (Shannan), 9/16/2019    I, Andrade Phillips M.D. personally performed the services described in this documentation, as scribed by Adeola Peacock in my presence, and it is both accurate and complete.     E    The note accurately reflects work and decisions made by me.  Andrade Phillips  9/16/2019  10:50 PM

## 2020-05-19 ENCOUNTER — HOSPITAL ENCOUNTER (EMERGENCY)
Facility: MEDICAL CENTER | Age: 25
End: 2020-05-19
Attending: EMERGENCY MEDICINE
Payer: MEDICAID

## 2020-05-19 ENCOUNTER — APPOINTMENT (OUTPATIENT)
Dept: RADIOLOGY | Facility: MEDICAL CENTER | Age: 25
End: 2020-05-19
Attending: EMERGENCY MEDICINE
Payer: MEDICAID

## 2020-05-19 VITALS
HEIGHT: 72 IN | BODY MASS INDEX: 27.62 KG/M2 | HEART RATE: 72 BPM | SYSTOLIC BLOOD PRESSURE: 112 MMHG | RESPIRATION RATE: 16 BRPM | TEMPERATURE: 98.6 F | WEIGHT: 203.93 LBS | DIASTOLIC BLOOD PRESSURE: 68 MMHG | OXYGEN SATURATION: 99 %

## 2020-05-19 DIAGNOSIS — O03.9 MISCARRIAGE: ICD-10-CM

## 2020-05-19 DIAGNOSIS — N93.9 VAGINAL BLEEDING: ICD-10-CM

## 2020-05-19 LAB
ALBUMIN SERPL BCP-MCNC: 4.3 G/DL (ref 3.2–4.9)
ALBUMIN/GLOB SERPL: 1.6 G/DL
ALP SERPL-CCNC: 58 U/L (ref 30–99)
ALT SERPL-CCNC: 12 U/L (ref 2–50)
ANION GAP SERPL CALC-SCNC: 10 MMOL/L (ref 7–16)
AST SERPL-CCNC: 16 U/L (ref 12–45)
B-HCG SERPL-ACNC: 14.6 MIU/ML (ref 0–5)
BASOPHILS # BLD AUTO: 0.5 % (ref 0–1.8)
BASOPHILS # BLD: 0.02 K/UL (ref 0–0.12)
BILIRUB SERPL-MCNC: 1 MG/DL (ref 0.1–1.5)
BUN SERPL-MCNC: 12 MG/DL (ref 8–22)
CALCIUM SERPL-MCNC: 9.3 MG/DL (ref 8.5–10.5)
CHLORIDE SERPL-SCNC: 101 MMOL/L (ref 96–112)
CO2 SERPL-SCNC: 23 MMOL/L (ref 20–33)
CREAT SERPL-MCNC: 0.84 MG/DL (ref 0.5–1.4)
EOSINOPHIL # BLD AUTO: 0.04 K/UL (ref 0–0.51)
EOSINOPHIL NFR BLD: 1 % (ref 0–6.9)
ERYTHROCYTE [DISTWIDTH] IN BLOOD BY AUTOMATED COUNT: 43.3 FL (ref 35.9–50)
GLOBULIN SER CALC-MCNC: 2.7 G/DL (ref 1.9–3.5)
GLUCOSE SERPL-MCNC: 91 MG/DL (ref 65–99)
HCT VFR BLD AUTO: 42.3 % (ref 37–47)
HGB BLD-MCNC: 14 G/DL (ref 12–16)
IMM GRANULOCYTES # BLD AUTO: 0 K/UL (ref 0–0.11)
IMM GRANULOCYTES NFR BLD AUTO: 0 % (ref 0–0.9)
LYMPHOCYTES # BLD AUTO: 1.31 K/UL (ref 1–4.8)
LYMPHOCYTES NFR BLD: 33 % (ref 22–41)
MCH RBC QN AUTO: 30.5 PG (ref 27–33)
MCHC RBC AUTO-ENTMCNC: 33.1 G/DL (ref 33.6–35)
MCV RBC AUTO: 92.2 FL (ref 81.4–97.8)
MONOCYTES # BLD AUTO: 0.32 K/UL (ref 0–0.85)
MONOCYTES NFR BLD AUTO: 8.1 % (ref 0–13.4)
NEUTROPHILS # BLD AUTO: 2.28 K/UL (ref 2–7.15)
NEUTROPHILS NFR BLD: 57.4 % (ref 44–72)
NRBC # BLD AUTO: 0 K/UL
NRBC BLD-RTO: 0 /100 WBC
NUMBER OF RH DOSES IND 8505RD: NORMAL
PLATELET # BLD AUTO: 185 K/UL (ref 164–446)
PMV BLD AUTO: 11.2 FL (ref 9–12.9)
POTASSIUM SERPL-SCNC: 3.8 MMOL/L (ref 3.6–5.5)
PROT SERPL-MCNC: 7 G/DL (ref 6–8.2)
RBC # BLD AUTO: 4.59 M/UL (ref 4.2–5.4)
RH BLD: NORMAL
SODIUM SERPL-SCNC: 134 MMOL/L (ref 135–145)
WBC # BLD AUTO: 4 K/UL (ref 4.8–10.8)

## 2020-05-19 PROCEDURE — 85025 COMPLETE CBC W/AUTO DIFF WBC: CPT

## 2020-05-19 PROCEDURE — 84702 CHORIONIC GONADOTROPIN TEST: CPT

## 2020-05-19 PROCEDURE — 86901 BLOOD TYPING SEROLOGIC RH(D): CPT

## 2020-05-19 PROCEDURE — 99284 EMERGENCY DEPT VISIT MOD MDM: CPT

## 2020-05-19 PROCEDURE — 80053 COMPREHEN METABOLIC PANEL: CPT

## 2020-05-19 PROCEDURE — 76801 OB US < 14 WKS SINGLE FETUS: CPT

## 2020-05-19 ASSESSMENT — FIBROSIS 4 INDEX: FIB4 SCORE: 1.32

## 2020-05-19 NOTE — ED NOTES
Agree with triage note. Ambulatory independently without difficulty. States this is her fifth pregnancy. She has three living children and had an ectopic pregnancy in December of 2019. Endorses lower abdominal pain. Denies fevers, chills, changes in urination.

## 2020-05-19 NOTE — ED PROVIDER NOTES
ED Provider Note    CHIEF COMPLAINT  Chief Complaint   Patient presents with   • Vaginal Bleeding     reports positive pregnancy test on saturday and started to bleed on sunday, pt reports clots, last period april 3.       HPI  Natalie Pop is a 24 y.o. female who presents to the emergency department for evaluation of abdominal pain and vaginal bleeding.  The patient is a G5, P3 ectopic 1 with a last menstrual period of April 3.  She found that she was pregnant just a few days ago.  She since has developed bleeding.  Is been associate with pain in the lower abdomen.  No nausea vomiting.  She does have some nausea no diarrhea no fevers no chills.  No cough.  No urinary complaints.  Denies any STD risk factors.  Denies any other aggravating relieving factors or associated complaints.  Prior ectopic pregnancy was treated with removal of an ovarian tube.  She does not know which one.  No other acute concerns or complaints.    REVIEW OF SYSTEMS  See HPI for further details. All other systems are negative.    PAST MEDICAL HISTORY  Past Medical History:   Diagnosis Date   • Inflammatory disease of ovary, fallopian tube, pelvic cellular tissue, and peritoneum        FAMILY HISTORY  Family History   Problem Relation Age of Onset   • No Known Problems Paternal Grandmother        SOCIAL HISTORY  Social History     Socioeconomic History   • Marital status: Legally      Spouse name: Not on file   • Number of children: Not on file   • Years of education: Not on file   • Highest education level: Not on file   Occupational History   • Not on file   Social Needs   • Financial resource strain: Not on file   • Food insecurity     Worry: Not on file     Inability: Not on file   • Transportation needs     Medical: Not on file     Non-medical: Not on file   Tobacco Use   • Smoking status: Current Every Day Smoker     Packs/day: 1.00     Years: 10.00     Pack years: 10.00     Types: Cigarettes     Start date:  2015   • Smokeless tobacco: Never Used   Substance and Sexual Activity   • Alcohol use: No   • Drug use: Yes     Comment: marijuana   • Sexual activity: Yes     Partners: Male     Comment: Unplanned pregnancy   Lifestyle   • Physical activity     Days per week: Not on file     Minutes per session: Not on file   • Stress: Not on file   Relationships   • Social connections     Talks on phone: Not on file     Gets together: Not on file     Attends Baptism service: Not on file     Active member of club or organization: Not on file     Attends meetings of clubs or organizations: Not on file     Relationship status: Not on file   • Intimate partner violence     Fear of current or ex partner: Not on file     Emotionally abused: Not on file     Physically abused: Not on file     Forced sexual activity: Not on file   Other Topics Concern   • Not on file   Social History Narrative   • Not on file       SURGICAL HISTORY  Past Surgical History:   Procedure Laterality Date   • PELVISCOPY  2018    Procedure: DIAGNOSTIC LAPAROSCOPY, DRAINAGE OF HEMOPERITONEUM;  Surgeon: Breanna Argueta M.D.;  Location: SURGERY [facility];  Service: Gynecology   • SALPINGECTOMY Right 2018    Procedure: SALPINGECTOMY;  Surgeon: Breanna Argueta M.D.;  Location: SURGERY [facility];  Service: Gynecology   • DILATION AND CURETTAGE  2018    Procedure: DILATION AND CURETTAGE;  Surgeon: Breanna Argueta M.D.;  Location: SURGERY [facility];  Service: Gynecology   • REPEAT C SECTION  2018    Procedure: REPEAT C SECTION;  Surgeon: Erich Beltran M.D.;  Location: LABOR AND DELIVERY;  Service: Labor and Delivery   • REPEAT C SECTION  2015    Performed by Erich Beltran M.D. at LABOR AND DELIVERY   • PRIMARY C SECTION  2012   • GYN SURGERY  2012           CURRENT MEDICATIONS  Home Medications     Reviewed by Shu Rodriguez R.N. (Registered Nurse) on 20 at 4809  Med List Status:  Partial   Medication Last Dose Status        Patient Stanley Taking any Medications                       ALLERGIES  Allergies   Allergen Reactions   • Pcn [Penicillins] Hives       PHYSICAL EXAM  VITAL SIGNS: /73   Pulse 80   Temp 36 °C (96.8 °F) (Temporal)   Resp 16   Ht 1.829 m (6')   Wt 92.5 kg (203 lb 14.8 oz)   LMP 04/03/2020   SpO2 97%   BMI 27.66 kg/m²    Constitutional: Well developed, Well nourished, No acute distress, Non-toxic appearance.   HENT: Normocephalic, Atraumatic, Bilateral external ears normal, Oropharynx moist, No oral exudates, Nose normal.   Eyes: PERRL, EOMI, Conjunctiva normal, No discharge.   Neck: Normal range of motion, No tenderness,  Cardiovascular: Normal heart rate, Normal rhythm, No murmurs, No rubs, No gallops.   Thorax & Lungs: Normal breath sounds, No respiratory distress, No wheezing  Abdomen: Bowel sounds normal, Soft, No tenderness  Skin: Warm, Dry, No erythema, No rash.   Back: No tenderness, No CVA tenderness.   Musculoskeletal: Good range of motion in all major joints.  Neurologic: Alert, No focal deficits noted.   Psychiatric: Affect anxious    Results for orders placed or performed during the hospital encounter of 05/19/20   CBC WITH DIFFERENTIAL   Result Value Ref Range    WBC 4.0 (L) 4.8 - 10.8 K/uL    RBC 4.59 4.20 - 5.40 M/uL    Hemoglobin 14.0 12.0 - 16.0 g/dL    Hematocrit 42.3 37.0 - 47.0 %    MCV 92.2 81.4 - 97.8 fL    MCH 30.5 27.0 - 33.0 pg    MCHC 33.1 (L) 33.6 - 35.0 g/dL    RDW 43.3 35.9 - 50.0 fL    Platelet Count 185 164 - 446 K/uL    MPV 11.2 9.0 - 12.9 fL    Neutrophils-Polys 57.40 44.00 - 72.00 %    Lymphocytes 33.00 22.00 - 41.00 %    Monocytes 8.10 0.00 - 13.40 %    Eosinophils 1.00 0.00 - 6.90 %    Basophils 0.50 0.00 - 1.80 %    Immature Granulocytes 0.00 0.00 - 0.90 %    Nucleated RBC 0.00 /100 WBC    Neutrophils (Absolute) 2.28 2.00 - 7.15 K/uL    Lymphs (Absolute) 1.31 1.00 - 4.80 K/uL    Monos (Absolute) 0.32 0.00 - 0.85 K/uL    Eos  (Absolute) 0.04 0.00 - 0.51 K/uL    Baso (Absolute) 0.02 0.00 - 0.12 K/uL    Immature Granulocytes (abs) 0.00 0.00 - 0.11 K/uL    NRBC (Absolute) 0.00 K/uL   COMP METABOLIC PANEL   Result Value Ref Range    Sodium 134 (L) 135 - 145 mmol/L    Potassium 3.8 3.6 - 5.5 mmol/L    Chloride 101 96 - 112 mmol/L    Co2 23 20 - 33 mmol/L    Anion Gap 10.0 7.0 - 16.0    Glucose 91 65 - 99 mg/dL    Bun 12 8 - 22 mg/dL    Creatinine 0.84 0.50 - 1.40 mg/dL    Calcium 9.3 8.5 - 10.5 mg/dL    AST(SGOT) 16 12 - 45 U/L    ALT(SGPT) 12 2 - 50 U/L    Alkaline Phosphatase 58 30 - 99 U/L    Total Bilirubin 1.0 0.1 - 1.5 mg/dL    Albumin 4.3 3.2 - 4.9 g/dL    Total Protein 7.0 6.0 - 8.2 g/dL    Globulin 2.7 1.9 - 3.5 g/dL    A-G Ratio 1.6 g/dL   HCG QUANTITATIVE SERUM   Result Value Ref Range    Bhcg 14.6 (H) 0.0 - 5.0 mIU/mL   ESTIMATED GFR   Result Value Ref Range    GFR If African American >60 >60 mL/min/1.73 m 2    GFR If Non African American >60 >60 mL/min/1.73 m 2      RADIOLOGY/PROCEDURES  US-OB 1ST TRIMESTER WITH TRANSVAGINAL (COMBO)   Final Result      1.  No intrauterine gestation. This may be due to early stages of gestation, gestation of unknown location or missed . Consider follow-up with serial beta-hCGs and another pelvic ultrasound.   2.  A 2.1 cm fibroid in the anterior uterine body.   3.  Reported left oophorectomy. Unremarkable right ovary.            COURSE & MEDICAL DECISION MAKING  Pertinent Labs & Imaging studies reviewed. (See chart for details)    The patient presents emerge department with new lower abdominal discomfort, positive for excessive vaginal bleeding.  Differential diagnosis includes miscarriage in process, threatened miscarriage complete miscarriage or ectopic pregnancy.    The patient abdominal has benign without tenderness or peritonitis.  Labs are obtained work-up with a differential as above.  CMP reassuring.  Quantitative beta-hCG is 14.6.  This is strongly suggestive of a miscarriage.   Ultrasound shows a previous left oophorectomy.  She also has a uterine fibroid.  Made aware of this and she will follow-up.    Previous Rh is in the computer of a positive.  Await for this to be positive if it is not back positive should be discharged home.  Questions are answered, she is agreeable with the plan.    The patient was noted to have elevated blood pressure while in the ER and was counseled to see their doctor within one wee to have this rechecked.    Anjana Sullivan M.D.  [address] [address]  [phone]    Schedule an appointment as soon as possible for a visit in 2 days          FINAL IMPRESSION  1. Vaginal bleeding     2. Miscarriage         2.   3.         Electronically signed by: Chuck Cain M.D., 5/19/2020 3:12 PM

## 2020-05-19 NOTE — ED TRIAGE NOTES
Pt ambulated  to triage with   Chief Complaint   Patient presents with   • Vaginal Bleeding     reports positive pregnancy test on saturday and started to bleed on sunday, pt reports clots, last period april 3.     Pt denies any international or domestic travel in the last 30 days.  Pt denies being in close contact of anyone that has tested positive for COVID-19. Pt Informed regarding triage process and verbalized understanding to inform triage tech or RN for any changes in condition. Placed in lobby.

## 2020-05-19 NOTE — DISCHARGE INSTRUCTIONS
You are likely having a miscarriage.  You need to have a repeat blood test to make sure your pregnancy hormone returns to normal, negative level.  Follow-up with GYN, or your primary care doctor or return to the emergency department.    So the fibroid in her uterus.  Follow-up with GYN for this.    Return to the emergency department for pain, bleeding, or any other concerns.

## 2020-05-20 NOTE — ED NOTES
Discharged to home. Verbalized understanding of all discharge instructions, education, and follow-up care. Given work note. Ambulated out of facility independently.

## 2020-07-29 ENCOUNTER — ANESTHESIA EVENT (OUTPATIENT)
Dept: SURGERY | Facility: MEDICAL CENTER | Age: 25
DRG: 817 | End: 2020-07-29
Payer: MEDICAID

## 2020-07-29 ENCOUNTER — APPOINTMENT (OUTPATIENT)
Dept: RADIOLOGY | Facility: MEDICAL CENTER | Age: 25
DRG: 817 | End: 2020-07-29
Attending: EMERGENCY MEDICINE
Payer: MEDICAID

## 2020-07-29 ENCOUNTER — ANESTHESIA (OUTPATIENT)
Dept: SURGERY | Facility: MEDICAL CENTER | Age: 25
DRG: 817 | End: 2020-07-29
Payer: MEDICAID

## 2020-07-29 ENCOUNTER — HOSPITAL ENCOUNTER (INPATIENT)
Facility: MEDICAL CENTER | Age: 25
LOS: 2 days | DRG: 817 | End: 2020-07-31
Attending: EMERGENCY MEDICINE | Admitting: OBSTETRICS & GYNECOLOGY
Payer: MEDICAID

## 2020-07-29 DIAGNOSIS — G89.18 POST-OP PAIN: ICD-10-CM

## 2020-07-29 LAB
ABO GROUP BLD: NORMAL
ALBUMIN SERPL BCP-MCNC: 3.7 G/DL (ref 3.2–4.9)
ALBUMIN/GLOB SERPL: 1.9 G/DL
ALP SERPL-CCNC: 46 U/L (ref 30–99)
ALT SERPL-CCNC: 10 U/L (ref 2–50)
ANION GAP SERPL CALC-SCNC: 10 MMOL/L (ref 7–16)
ANION GAP SERPL CALC-SCNC: 12 MMOL/L (ref 7–16)
ANION GAP SERPL CALC-SCNC: 14 MMOL/L (ref 7–16)
APPEARANCE UR: CLEAR
AST SERPL-CCNC: 11 U/L (ref 12–45)
B-HCG SERPL-ACNC: 5506 MIU/ML (ref 0–5)
BACTERIA #/AREA URNS HPF: NEGATIVE /HPF
BARCODED ABORH UBTYP: 6200
BARCODED ABORH UBTYP: 8400
BARCODED PRD CODE UBPRD: NORMAL
BARCODED UNIT NUM UBUNT: NORMAL
BASOPHILS # BLD AUTO: 0.3 % (ref 0–1.8)
BASOPHILS # BLD AUTO: 0.4 % (ref 0–1.8)
BASOPHILS # BLD: 0.02 K/UL (ref 0–0.12)
BASOPHILS # BLD: 0.03 K/UL (ref 0–0.12)
BILIRUB SERPL-MCNC: 0.5 MG/DL (ref 0.1–1.5)
BILIRUB UR QL STRIP.AUTO: ABNORMAL
BLD GP AB SCN SERPL QL: NORMAL
BLOOD CULTURE HOLD CXBCH: NORMAL
BUN SERPL-MCNC: 11 MG/DL (ref 8–22)
BUN SERPL-MCNC: 12 MG/DL (ref 8–22)
BUN SERPL-MCNC: 13 MG/DL (ref 8–22)
CALCIUM SERPL-MCNC: 6.7 MG/DL (ref 8.5–10.5)
CALCIUM SERPL-MCNC: 8 MG/DL (ref 8.5–10.5)
CALCIUM SERPL-MCNC: 8.4 MG/DL (ref 8.5–10.5)
CAOX CRY #/AREA URNS HPF: ABNORMAL /HPF
CHLORIDE SERPL-SCNC: 104 MMOL/L (ref 96–112)
CHLORIDE SERPL-SCNC: 106 MMOL/L (ref 96–112)
CHLORIDE SERPL-SCNC: 108 MMOL/L (ref 96–112)
CO2 SERPL-SCNC: 19 MMOL/L (ref 20–33)
CO2 SERPL-SCNC: 21 MMOL/L (ref 20–33)
CO2 SERPL-SCNC: 21 MMOL/L (ref 20–33)
COLOR UR: YELLOW
COMPONENT F 8504F: NORMAL
COMPONENT FT 8504FT: NORMAL
COMPONENT P 8504P: NORMAL
COMPONENT P 8504P: NORMAL
COMPONENT R 8504R: NORMAL
COVID ORDER STATUS COVID19: NORMAL
CREAT SERPL-MCNC: 0.77 MG/DL (ref 0.5–1.4)
CREAT SERPL-MCNC: 0.96 MG/DL (ref 0.5–1.4)
CREAT SERPL-MCNC: 1.05 MG/DL (ref 0.5–1.4)
EOSINOPHIL # BLD AUTO: 0 K/UL (ref 0–0.51)
EOSINOPHIL # BLD AUTO: 0.06 K/UL (ref 0–0.51)
EOSINOPHIL NFR BLD: 0 % (ref 0–6.9)
EOSINOPHIL NFR BLD: 0.7 % (ref 0–6.9)
EPI CELLS #/AREA URNS HPF: NEGATIVE /HPF
ERYTHROCYTE [DISTWIDTH] IN BLOOD BY AUTOMATED COUNT: 43.7 FL (ref 35.9–50)
ERYTHROCYTE [DISTWIDTH] IN BLOOD BY AUTOMATED COUNT: 48.1 FL (ref 35.9–50)
ERYTHROCYTE [DISTWIDTH] IN BLOOD BY AUTOMATED COUNT: 48.7 FL (ref 35.9–50)
GLOBULIN SER CALC-MCNC: 2 G/DL (ref 1.9–3.5)
GLUCOSE SERPL-MCNC: 170 MG/DL (ref 65–99)
GLUCOSE SERPL-MCNC: 182 MG/DL (ref 65–99)
GLUCOSE SERPL-MCNC: 218 MG/DL (ref 65–99)
GLUCOSE UR STRIP.AUTO-MCNC: NEGATIVE MG/DL
HCG SERPL QL: POSITIVE
HCG UR QL: POSITIVE
HCT VFR BLD AUTO: 21.3 % (ref 37–47)
HCT VFR BLD AUTO: 23.4 % (ref 37–47)
HCT VFR BLD AUTO: 37.5 % (ref 37–47)
HGB BLD-MCNC: 12.2 G/DL (ref 12–16)
HGB BLD-MCNC: 6.9 G/DL (ref 12–16)
HGB BLD-MCNC: 7.5 G/DL (ref 12–16)
HYALINE CASTS #/AREA URNS LPF: ABNORMAL /LPF
IMM GRANULOCYTES # BLD AUTO: 0.03 K/UL (ref 0–0.11)
IMM GRANULOCYTES # BLD AUTO: 0.04 K/UL (ref 0–0.11)
IMM GRANULOCYTES NFR BLD AUTO: 0.4 % (ref 0–0.9)
IMM GRANULOCYTES NFR BLD AUTO: 0.5 % (ref 0–0.9)
KETONES UR STRIP.AUTO-MCNC: NEGATIVE MG/DL
LACTATE BLD-SCNC: 2 MMOL/L (ref 0.5–2)
LEUKOCYTE ESTERASE UR QL STRIP.AUTO: NEGATIVE
LYMPHOCYTES # BLD AUTO: 0.58 K/UL (ref 1–4.8)
LYMPHOCYTES # BLD AUTO: 2.67 K/UL (ref 1–4.8)
LYMPHOCYTES NFR BLD: 32.2 % (ref 22–41)
LYMPHOCYTES NFR BLD: 7.3 % (ref 22–41)
MAGNESIUM SERPL-MCNC: 1.4 MG/DL (ref 1.5–2.5)
MCH RBC QN AUTO: 30.2 PG (ref 27–33)
MCH RBC QN AUTO: 30.5 PG (ref 27–33)
MCH RBC QN AUTO: 30.9 PG (ref 27–33)
MCHC RBC AUTO-ENTMCNC: 32.2 G/DL (ref 33.6–35)
MCHC RBC AUTO-ENTMCNC: 32.4 G/DL (ref 33.6–35)
MCHC RBC AUTO-ENTMCNC: 32.5 G/DL (ref 33.6–35)
MCV RBC AUTO: 94 FL (ref 81.4–97.8)
MCV RBC AUTO: 94.2 FL (ref 81.4–97.8)
MCV RBC AUTO: 94.9 FL (ref 81.4–97.8)
MICRO URNS: ABNORMAL
MONOCYTES # BLD AUTO: 0.36 K/UL (ref 0–0.85)
MONOCYTES # BLD AUTO: 0.45 K/UL (ref 0–0.85)
MONOCYTES NFR BLD AUTO: 4.5 % (ref 0–13.4)
MONOCYTES NFR BLD AUTO: 5.4 % (ref 0–13.4)
NEUTROPHILS # BLD AUTO: 5.03 K/UL (ref 2–7.15)
NEUTROPHILS # BLD AUTO: 6.93 K/UL (ref 2–7.15)
NEUTROPHILS NFR BLD: 60.8 % (ref 44–72)
NEUTROPHILS NFR BLD: 87.5 % (ref 44–72)
NITRITE UR QL STRIP.AUTO: NEGATIVE
NRBC # BLD AUTO: 0 K/UL
NRBC # BLD AUTO: 0 K/UL
NRBC BLD-RTO: 0 /100 WBC
NRBC BLD-RTO: 0 /100 WBC
PATHOLOGY CONSULT NOTE: NORMAL
PH UR STRIP.AUTO: 6 [PH] (ref 5–8)
PHOSPHATE SERPL-MCNC: 3.7 MG/DL (ref 2.5–4.5)
PLATELET # BLD AUTO: 114 K/UL (ref 164–446)
PLATELET # BLD AUTO: 198 K/UL (ref 164–446)
PLATELET # BLD AUTO: 95 K/UL (ref 164–446)
PMV BLD AUTO: 10.8 FL (ref 9–12.9)
PMV BLD AUTO: 10.8 FL (ref 9–12.9)
PMV BLD AUTO: 10.9 FL (ref 9–12.9)
POTASSIUM SERPL-SCNC: 3.3 MMOL/L (ref 3.6–5.5)
POTASSIUM SERPL-SCNC: 4 MMOL/L (ref 3.6–5.5)
POTASSIUM SERPL-SCNC: 4.1 MMOL/L (ref 3.6–5.5)
PRODUCT TYPE UPROD: NORMAL
PROT SERPL-MCNC: 5.7 G/DL (ref 6–8.2)
PROT UR QL STRIP: 30 MG/DL
RBC # BLD AUTO: 2.26 M/UL (ref 4.2–5.4)
RBC # BLD AUTO: 2.48 M/UL (ref 4.2–5.4)
RBC # BLD AUTO: 3.95 M/UL (ref 4.2–5.4)
RBC # URNS HPF: ABNORMAL /HPF
RBC UR QL AUTO: NEGATIVE
RH BLD: NORMAL
SARS-COV-2 RDRP RESP QL NAA+PROBE: NOTDETECTED
SODIUM SERPL-SCNC: 137 MMOL/L (ref 135–145)
SODIUM SERPL-SCNC: 139 MMOL/L (ref 135–145)
SODIUM SERPL-SCNC: 139 MMOL/L (ref 135–145)
SP GR UR REFRACTOMETRY: 1.03
SPECIMEN SOURCE: NORMAL
UNIT STATUS USTAT: NORMAL
UROBILINOGEN UR STRIP.AUTO-MCNC: 0.2 MG/DL
WBC # BLD AUTO: 11.9 K/UL (ref 4.8–10.8)
WBC # BLD AUTO: 7.9 K/UL (ref 4.8–10.8)
WBC # BLD AUTO: 8.3 K/UL (ref 4.8–10.8)
WBC #/AREA URNS HPF: ABNORMAL /HPF

## 2020-07-29 PROCEDURE — 85027 COMPLETE CBC AUTOMATED: CPT

## 2020-07-29 PROCEDURE — 770001 HCHG ROOM/CARE - MED/SURG/GYN PRIV*

## 2020-07-29 PROCEDURE — 96374 THER/PROPH/DIAG INJ IV PUSH: CPT

## 2020-07-29 PROCEDURE — 81025 URINE PREGNANCY TEST: CPT

## 2020-07-29 PROCEDURE — 84702 CHORIONIC GONADOTROPIN TEST: CPT

## 2020-07-29 PROCEDURE — 160009 HCHG ANES TIME/MIN: Performed by: OBSTETRICS & GYNECOLOGY

## 2020-07-29 PROCEDURE — 76801 OB US < 14 WKS SINGLE FETUS: CPT

## 2020-07-29 PROCEDURE — 160048 HCHG OR STATISTICAL LEVEL 1-5: Performed by: OBSTETRICS & GYNECOLOGY

## 2020-07-29 PROCEDURE — 83605 ASSAY OF LACTIC ACID: CPT

## 2020-07-29 PROCEDURE — 30233N1 TRANSFUSION OF NONAUTOLOGOUS RED BLOOD CELLS INTO PERIPHERAL VEIN, PERCUTANEOUS APPROACH: ICD-10-PCS | Performed by: OBSTETRICS & GYNECOLOGY

## 2020-07-29 PROCEDURE — 86900 BLOOD TYPING SEROLOGIC ABO: CPT

## 2020-07-29 PROCEDURE — 30233K1 TRANSFUSION OF NONAUTOLOGOUS FROZEN PLASMA INTO PERIPHERAL VEIN, PERCUTANEOUS APPROACH: ICD-10-PCS | Performed by: OBSTETRICS & GYNECOLOGY

## 2020-07-29 PROCEDURE — 700105 HCHG RX REV CODE 258: Performed by: ANESTHESIOLOGY

## 2020-07-29 PROCEDURE — U0004 COV-19 TEST NON-CDC HGH THRU: HCPCS

## 2020-07-29 PROCEDURE — 86850 RBC ANTIBODY SCREEN: CPT

## 2020-07-29 PROCEDURE — 88305 TISSUE EXAM BY PATHOLOGIST: CPT

## 2020-07-29 PROCEDURE — 700102 HCHG RX REV CODE 250 W/ 637 OVERRIDE(OP): Performed by: ANESTHESIOLOGY

## 2020-07-29 PROCEDURE — 700111 HCHG RX REV CODE 636 W/ 250 OVERRIDE (IP): Performed by: OBSTETRICS & GYNECOLOGY

## 2020-07-29 PROCEDURE — 10T20ZZ RESECTION OF PRODUCTS OF CONCEPTION, ECTOPIC, OPEN APPROACH: ICD-10-PCS | Performed by: OBSTETRICS & GYNECOLOGY

## 2020-07-29 PROCEDURE — 700111 HCHG RX REV CODE 636 W/ 250 OVERRIDE (IP): Performed by: EMERGENCY MEDICINE

## 2020-07-29 PROCEDURE — P9017 PLASMA 1 DONOR FRZ W/IN 8 HR: HCPCS | Mod: 91

## 2020-07-29 PROCEDURE — 700112 HCHG RX REV CODE 229: Performed by: OBSTETRICS & GYNECOLOGY

## 2020-07-29 PROCEDURE — C9803 HOPD COVID-19 SPEC COLLECT: HCPCS | Performed by: EMERGENCY MEDICINE

## 2020-07-29 PROCEDURE — 700102 HCHG RX REV CODE 250 W/ 637 OVERRIDE(OP): Performed by: OBSTETRICS & GYNECOLOGY

## 2020-07-29 PROCEDURE — 99291 CRITICAL CARE FIRST HOUR: CPT

## 2020-07-29 PROCEDURE — 86923 COMPATIBILITY TEST ELECTRIC: CPT | Mod: 91

## 2020-07-29 PROCEDURE — P9034 PLATELETS, PHERESIS: HCPCS | Mod: 91

## 2020-07-29 PROCEDURE — 501838 HCHG SUTURE GENERAL: Performed by: OBSTETRICS & GYNECOLOGY

## 2020-07-29 PROCEDURE — 83735 ASSAY OF MAGNESIUM: CPT

## 2020-07-29 PROCEDURE — 80048 BASIC METABOLIC PNL TOTAL CA: CPT

## 2020-07-29 PROCEDURE — 700111 HCHG RX REV CODE 636 W/ 250 OVERRIDE (IP)

## 2020-07-29 PROCEDURE — 85025 COMPLETE CBC W/AUTO DIFF WBC: CPT

## 2020-07-29 PROCEDURE — 84100 ASSAY OF PHOSPHORUS: CPT

## 2020-07-29 PROCEDURE — 160036 HCHG PACU - EA ADDL 30 MINS PHASE I: Performed by: OBSTETRICS & GYNECOLOGY

## 2020-07-29 PROCEDURE — 700105 HCHG RX REV CODE 258: Performed by: EMERGENCY MEDICINE

## 2020-07-29 PROCEDURE — A9270 NON-COVERED ITEM OR SERVICE: HCPCS | Performed by: ANESTHESIOLOGY

## 2020-07-29 PROCEDURE — 500886 HCHG PACK, LAPAROSCOPY: Performed by: OBSTETRICS & GYNECOLOGY

## 2020-07-29 PROCEDURE — A9270 NON-COVERED ITEM OR SERVICE: HCPCS | Performed by: OBSTETRICS & GYNECOLOGY

## 2020-07-29 PROCEDURE — 501572 HCHG TROCAR, SHIELD OBTU 5X100: Performed by: OBSTETRICS & GYNECOLOGY

## 2020-07-29 PROCEDURE — 502704 HCHG DEVICE, LIGASURE IMPACT: Performed by: OBSTETRICS & GYNECOLOGY

## 2020-07-29 PROCEDURE — 160029 HCHG SURGERY MINUTES - 1ST 30 MINS LEVEL 4: Performed by: OBSTETRICS & GYNECOLOGY

## 2020-07-29 PROCEDURE — 700111 HCHG RX REV CODE 636 W/ 250 OVERRIDE (IP): Performed by: ANESTHESIOLOGY

## 2020-07-29 PROCEDURE — 30233R1 TRANSFUSION OF NONAUTOLOGOUS PLATELETS INTO PERIPHERAL VEIN, PERCUTANEOUS APPROACH: ICD-10-PCS | Performed by: OBSTETRICS & GYNECOLOGY

## 2020-07-29 PROCEDURE — 0W9G0ZZ DRAINAGE OF PERITONEAL CAVITY, OPEN APPROACH: ICD-10-PCS | Performed by: OBSTETRICS & GYNECOLOGY

## 2020-07-29 PROCEDURE — P9016 RBC LEUKOCYTES REDUCED: HCPCS

## 2020-07-29 PROCEDURE — 0UB60ZZ EXCISION OF LEFT FALLOPIAN TUBE, OPEN APPROACH: ICD-10-PCS | Performed by: OBSTETRICS & GYNECOLOGY

## 2020-07-29 PROCEDURE — 160035 HCHG PACU - 1ST 60 MINS PHASE I: Performed by: OBSTETRICS & GYNECOLOGY

## 2020-07-29 PROCEDURE — 80053 COMPREHEN METABOLIC PANEL: CPT

## 2020-07-29 PROCEDURE — 86901 BLOOD TYPING SEROLOGIC RH(D): CPT

## 2020-07-29 PROCEDURE — 501582 HCHG TROCAR, THRD BLADED: Performed by: OBSTETRICS & GYNECOLOGY

## 2020-07-29 PROCEDURE — 700101 HCHG RX REV CODE 250: Performed by: ANESTHESIOLOGY

## 2020-07-29 PROCEDURE — 160002 HCHG RECOVERY MINUTES (STAT): Performed by: OBSTETRICS & GYNECOLOGY

## 2020-07-29 PROCEDURE — 500868 HCHG NEEDLE, SURGI(VARES): Performed by: OBSTETRICS & GYNECOLOGY

## 2020-07-29 PROCEDURE — 81001 URINALYSIS AUTO W/SCOPE: CPT

## 2020-07-29 PROCEDURE — 160041 HCHG SURGERY MINUTES - EA ADDL 1 MIN LEVEL 4: Performed by: OBSTETRICS & GYNECOLOGY

## 2020-07-29 PROCEDURE — 36430 TRANSFUSION BLD/BLD COMPNT: CPT

## 2020-07-29 PROCEDURE — 36415 COLL VENOUS BLD VENIPUNCTURE: CPT

## 2020-07-29 PROCEDURE — 84703 CHORIONIC GONADOTROPIN ASSAY: CPT

## 2020-07-29 PROCEDURE — 501583 HCHG TROCAR, THRD CAN&SEAL 5X100: Performed by: OBSTETRICS & GYNECOLOGY

## 2020-07-29 RX ORDER — PHENYLEPHRINE HCL IN 0.9% NACL 0.5 MG/5ML
200 SYRINGE (ML) INTRAVENOUS ONCE
Status: COMPLETED | OUTPATIENT
Start: 2020-07-29 | End: 2020-07-29

## 2020-07-29 RX ORDER — OXYCODONE HYDROCHLORIDE 5 MG/1
5 TABLET ORAL
Status: DISCONTINUED | OUTPATIENT
Start: 2020-07-29 | End: 2020-07-31 | Stop reason: HOSPADM

## 2020-07-29 RX ORDER — MIDAZOLAM HYDROCHLORIDE 1 MG/ML
1 INJECTION INTRAMUSCULAR; INTRAVENOUS
Status: DISCONTINUED | OUTPATIENT
Start: 2020-07-29 | End: 2020-07-29

## 2020-07-29 RX ORDER — PHENYLEPHRINE HCL IN 0.9% NACL 0.5 MG/5ML
SYRINGE (ML) INTRAVENOUS PRN
Status: DISCONTINUED | OUTPATIENT
Start: 2020-07-29 | End: 2020-07-29 | Stop reason: SURG

## 2020-07-29 RX ORDER — SODIUM CHLORIDE, SODIUM GLUCONATE, SODIUM ACETATE, POTASSIUM CHLORIDE AND MAGNESIUM CHLORIDE 526; 502; 368; 37; 30 MG/100ML; MG/100ML; MG/100ML; MG/100ML; MG/100ML
500 INJECTION, SOLUTION INTRAVENOUS CONTINUOUS
Status: DISCONTINUED | OUTPATIENT
Start: 2020-07-29 | End: 2020-07-29

## 2020-07-29 RX ORDER — SODIUM CHLORIDE, SODIUM GLUCONATE, SODIUM ACETATE, POTASSIUM CHLORIDE AND MAGNESIUM CHLORIDE 526; 502; 368; 37; 30 MG/100ML; MG/100ML; MG/100ML; MG/100ML; MG/100ML
INJECTION, SOLUTION INTRAVENOUS
Status: DISCONTINUED | OUTPATIENT
Start: 2020-07-29 | End: 2020-07-29 | Stop reason: SURG

## 2020-07-29 RX ORDER — BISACODYL 10 MG
10 SUPPOSITORY, RECTAL RECTAL
Status: DISCONTINUED | OUTPATIENT
Start: 2020-07-29 | End: 2020-07-31 | Stop reason: HOSPADM

## 2020-07-29 RX ORDER — IPRATROPIUM BROMIDE AND ALBUTEROL SULFATE 2.5; .5 MG/3ML; MG/3ML
3 SOLUTION RESPIRATORY (INHALATION)
Status: DISCONTINUED | OUTPATIENT
Start: 2020-07-29 | End: 2020-07-29

## 2020-07-29 RX ORDER — IBUPROFEN 800 MG/1
800 TABLET ORAL 3 TIMES DAILY
Status: DISCONTINUED | OUTPATIENT
Start: 2020-07-29 | End: 2020-07-31 | Stop reason: HOSPADM

## 2020-07-29 RX ORDER — HYDROMORPHONE HYDROCHLORIDE 1 MG/ML
0.2 INJECTION, SOLUTION INTRAMUSCULAR; INTRAVENOUS; SUBCUTANEOUS
Status: DISCONTINUED | OUTPATIENT
Start: 2020-07-29 | End: 2020-07-29

## 2020-07-29 RX ORDER — ROCURONIUM BROMIDE 10 MG/ML
INJECTION, SOLUTION INTRAVENOUS PRN
Status: DISCONTINUED | OUTPATIENT
Start: 2020-07-29 | End: 2020-07-29 | Stop reason: SURG

## 2020-07-29 RX ORDER — MIDAZOLAM HYDROCHLORIDE 1 MG/ML
INJECTION INTRAMUSCULAR; INTRAVENOUS PRN
Status: DISCONTINUED | OUTPATIENT
Start: 2020-07-29 | End: 2020-07-29 | Stop reason: SURG

## 2020-07-29 RX ORDER — DOCUSATE SODIUM 100 MG/1
100 CAPSULE, LIQUID FILLED ORAL 2 TIMES DAILY
Status: DISCONTINUED | OUTPATIENT
Start: 2020-07-29 | End: 2020-07-31 | Stop reason: HOSPADM

## 2020-07-29 RX ORDER — ACETAMINOPHEN 500 MG
1000 TABLET ORAL EVERY 6 HOURS
Status: DISCONTINUED | OUTPATIENT
Start: 2020-07-29 | End: 2020-07-31 | Stop reason: HOSPADM

## 2020-07-29 RX ORDER — AMOXICILLIN 250 MG
1 CAPSULE ORAL
Status: DISCONTINUED | OUTPATIENT
Start: 2020-07-29 | End: 2020-07-29

## 2020-07-29 RX ORDER — ONDANSETRON 2 MG/ML
INJECTION INTRAMUSCULAR; INTRAVENOUS PRN
Status: DISCONTINUED | OUTPATIENT
Start: 2020-07-29 | End: 2020-07-29 | Stop reason: SURG

## 2020-07-29 RX ORDER — HYDROMORPHONE HYDROCHLORIDE 1 MG/ML
0.5 INJECTION, SOLUTION INTRAMUSCULAR; INTRAVENOUS; SUBCUTANEOUS
Status: DISCONTINUED | OUTPATIENT
Start: 2020-07-29 | End: 2020-07-31 | Stop reason: HOSPADM

## 2020-07-29 RX ORDER — HYDROMORPHONE HYDROCHLORIDE 1 MG/ML
1 INJECTION, SOLUTION INTRAMUSCULAR; INTRAVENOUS; SUBCUTANEOUS
Status: DISCONTINUED | OUTPATIENT
Start: 2020-07-29 | End: 2020-07-29

## 2020-07-29 RX ORDER — KETOROLAC TROMETHAMINE 30 MG/ML
INJECTION, SOLUTION INTRAMUSCULAR; INTRAVENOUS PRN
Status: DISCONTINUED | OUTPATIENT
Start: 2020-07-29 | End: 2020-07-29 | Stop reason: SURG

## 2020-07-29 RX ORDER — DIPHENHYDRAMINE HYDROCHLORIDE 50 MG/ML
12.5 INJECTION INTRAMUSCULAR; INTRAVENOUS
Status: DISCONTINUED | OUTPATIENT
Start: 2020-07-29 | End: 2020-07-29

## 2020-07-29 RX ORDER — MEPERIDINE HYDROCHLORIDE 25 MG/ML
INJECTION INTRAMUSCULAR; INTRAVENOUS; SUBCUTANEOUS
Status: COMPLETED
Start: 2020-07-29 | End: 2020-07-29

## 2020-07-29 RX ORDER — SODIUM CHLORIDE, SODIUM LACTATE, POTASSIUM CHLORIDE, CALCIUM CHLORIDE 600; 310; 30; 20 MG/100ML; MG/100ML; MG/100ML; MG/100ML
INJECTION, SOLUTION INTRAVENOUS
Status: DISCONTINUED | OUTPATIENT
Start: 2020-07-29 | End: 2020-07-29 | Stop reason: SURG

## 2020-07-29 RX ORDER — POLYETHYLENE GLYCOL 3350 17 G/17G
1 POWDER, FOR SOLUTION ORAL 2 TIMES DAILY PRN
Status: DISCONTINUED | OUTPATIENT
Start: 2020-07-29 | End: 2020-07-31 | Stop reason: HOSPADM

## 2020-07-29 RX ORDER — DEXAMETHASONE SODIUM PHOSPHATE 4 MG/ML
INJECTION, SOLUTION INTRA-ARTICULAR; INTRALESIONAL; INTRAMUSCULAR; INTRAVENOUS; SOFT TISSUE PRN
Status: DISCONTINUED | OUTPATIENT
Start: 2020-07-29 | End: 2020-07-29 | Stop reason: SURG

## 2020-07-29 RX ORDER — AMOXICILLIN 250 MG
1 CAPSULE ORAL NIGHTLY
Status: DISCONTINUED | OUTPATIENT
Start: 2020-07-29 | End: 2020-07-29

## 2020-07-29 RX ORDER — MEPERIDINE HYDROCHLORIDE 25 MG/ML
12.5 INJECTION INTRAMUSCULAR; INTRAVENOUS; SUBCUTANEOUS
Status: DISCONTINUED | OUTPATIENT
Start: 2020-07-29 | End: 2020-07-29

## 2020-07-29 RX ORDER — HALOPERIDOL 5 MG/ML
1 INJECTION INTRAMUSCULAR
Status: DISCONTINUED | OUTPATIENT
Start: 2020-07-29 | End: 2020-07-29

## 2020-07-29 RX ORDER — OXYCODONE HYDROCHLORIDE 5 MG/1
10 TABLET ORAL
Status: DISCONTINUED | OUTPATIENT
Start: 2020-07-29 | End: 2020-07-31 | Stop reason: HOSPADM

## 2020-07-29 RX ORDER — CEFAZOLIN SODIUM 1 G/3ML
INJECTION, POWDER, FOR SOLUTION INTRAMUSCULAR; INTRAVENOUS PRN
Status: DISCONTINUED | OUTPATIENT
Start: 2020-07-29 | End: 2020-07-29 | Stop reason: SURG

## 2020-07-29 RX ORDER — OXYCODONE HCL 5 MG/5 ML
5 SOLUTION, ORAL ORAL
Status: COMPLETED | OUTPATIENT
Start: 2020-07-29 | End: 2020-07-29

## 2020-07-29 RX ORDER — CALCIUM CHLORIDE 100 MG/ML
INJECTION INTRAVENOUS; INTRAVENTRICULAR PRN
Status: DISCONTINUED | OUTPATIENT
Start: 2020-07-29 | End: 2020-07-29 | Stop reason: SURG

## 2020-07-29 RX ORDER — SCOLOPAMINE TRANSDERMAL SYSTEM 1 MG/1
1 PATCH, EXTENDED RELEASE TRANSDERMAL
Status: DISCONTINUED | OUTPATIENT
Start: 2020-07-29 | End: 2020-07-31 | Stop reason: HOSPADM

## 2020-07-29 RX ORDER — DIPHENHYDRAMINE HYDROCHLORIDE 50 MG/ML
25 INJECTION INTRAMUSCULAR; INTRAVENOUS EVERY 6 HOURS PRN
Status: DISCONTINUED | OUTPATIENT
Start: 2020-07-29 | End: 2020-07-31 | Stop reason: HOSPADM

## 2020-07-29 RX ORDER — HALOPERIDOL 5 MG/ML
1 INJECTION INTRAMUSCULAR EVERY 6 HOURS PRN
Status: DISCONTINUED | OUTPATIENT
Start: 2020-07-29 | End: 2020-07-31 | Stop reason: HOSPADM

## 2020-07-29 RX ORDER — ONDANSETRON 2 MG/ML
4 INJECTION INTRAMUSCULAR; INTRAVENOUS
Status: COMPLETED | OUTPATIENT
Start: 2020-07-29 | End: 2020-07-29

## 2020-07-29 RX ORDER — ENEMA 19; 7 G/133ML; G/133ML
1 ENEMA RECTAL
Status: DISCONTINUED | OUTPATIENT
Start: 2020-07-29 | End: 2020-07-31 | Stop reason: HOSPADM

## 2020-07-29 RX ORDER — LIDOCAINE HYDROCHLORIDE 20 MG/ML
INJECTION, SOLUTION EPIDURAL; INFILTRATION; INTRACAUDAL; PERINEURAL PRN
Status: DISCONTINUED | OUTPATIENT
Start: 2020-07-29 | End: 2020-07-29 | Stop reason: SURG

## 2020-07-29 RX ORDER — HYDROMORPHONE HYDROCHLORIDE 1 MG/ML
0.4 INJECTION, SOLUTION INTRAMUSCULAR; INTRAVENOUS; SUBCUTANEOUS
Status: DISCONTINUED | OUTPATIENT
Start: 2020-07-29 | End: 2020-07-29

## 2020-07-29 RX ORDER — DEXTROSE MONOHYDRATE, SODIUM CHLORIDE, AND POTASSIUM CHLORIDE 50; 1.49; 4.5 G/1000ML; G/1000ML; G/1000ML
INJECTION, SOLUTION INTRAVENOUS EVERY 6 HOURS
Status: ACTIVE | OUTPATIENT
Start: 2020-07-29 | End: 2020-07-29

## 2020-07-29 RX ORDER — OXYCODONE HCL 5 MG/5 ML
10 SOLUTION, ORAL ORAL
Status: COMPLETED | OUTPATIENT
Start: 2020-07-29 | End: 2020-07-29

## 2020-07-29 RX ORDER — DEXAMETHASONE SODIUM PHOSPHATE 4 MG/ML
4 INJECTION, SOLUTION INTRA-ARTICULAR; INTRALESIONAL; INTRAMUSCULAR; INTRAVENOUS; SOFT TISSUE
Status: DISCONTINUED | OUTPATIENT
Start: 2020-07-29 | End: 2020-07-31 | Stop reason: HOSPADM

## 2020-07-29 RX ORDER — ONDANSETRON 2 MG/ML
4 INJECTION INTRAMUSCULAR; INTRAVENOUS EVERY 4 HOURS PRN
Status: DISCONTINUED | OUTPATIENT
Start: 2020-07-29 | End: 2020-07-31 | Stop reason: HOSPADM

## 2020-07-29 RX ORDER — SODIUM CHLORIDE, SODIUM LACTATE, POTASSIUM CHLORIDE, CALCIUM CHLORIDE 600; 310; 30; 20 MG/100ML; MG/100ML; MG/100ML; MG/100ML
1000 INJECTION, SOLUTION INTRAVENOUS ONCE
Status: COMPLETED | OUTPATIENT
Start: 2020-07-29 | End: 2020-07-29

## 2020-07-29 RX ORDER — SIMETHICONE 80 MG
80 TABLET,CHEWABLE ORAL 3 TIMES DAILY PRN
Status: DISCONTINUED | OUTPATIENT
Start: 2020-07-29 | End: 2020-07-31 | Stop reason: HOSPADM

## 2020-07-29 RX ORDER — SODIUM CHLORIDE 9 MG/ML
1000 INJECTION, SOLUTION INTRAVENOUS ONCE
Status: COMPLETED | OUTPATIENT
Start: 2020-07-29 | End: 2020-07-29

## 2020-07-29 RX ORDER — SUCCINYLCHOLINE/SOD CL,ISO/PF 200MG/10ML
SYRINGE (ML) INTRAVENOUS PRN
Status: DISCONTINUED | OUTPATIENT
Start: 2020-07-29 | End: 2020-07-29 | Stop reason: SURG

## 2020-07-29 RX ADMIN — MEPERIDINE HYDROCHLORIDE 12.5 MG: 25 INJECTION INTRAMUSCULAR; INTRAVENOUS; SUBCUTANEOUS at 09:18

## 2020-07-29 RX ADMIN — Medication 120 MG: at 07:42

## 2020-07-29 RX ADMIN — KETOROLAC TROMETHAMINE 30 MG: 30 INJECTION, SOLUTION INTRAMUSCULAR at 08:50

## 2020-07-29 RX ADMIN — FENTANYL CITRATE 25 MCG: 50 INJECTION INTRAMUSCULAR; INTRAVENOUS at 10:49

## 2020-07-29 RX ADMIN — SODIUM CHLORIDE, SODIUM GLUCONATE, SODIUM ACETATE, POTASSIUM CHLORIDE AND MAGNESIUM CHLORIDE: 526; 502; 368; 37; 30 INJECTION, SOLUTION INTRAVENOUS at 07:40

## 2020-07-29 RX ADMIN — SODIUM CHLORIDE 1000 ML: 9 INJECTION, SOLUTION INTRAVENOUS at 06:10

## 2020-07-29 RX ADMIN — ONDANSETRON 4 MG: 2 INJECTION INTRAMUSCULAR; INTRAVENOUS at 08:50

## 2020-07-29 RX ADMIN — ACETAMINOPHEN 1000 MG: 500 TABLET ORAL at 20:39

## 2020-07-29 RX ADMIN — ROCURONIUM BROMIDE 70 MG: 10 INJECTION, SOLUTION INTRAVENOUS at 07:50

## 2020-07-29 RX ADMIN — FENTANYL CITRATE 100 MCG: 50 INJECTION INTRAMUSCULAR; INTRAVENOUS at 08:53

## 2020-07-29 RX ADMIN — HYDROMORPHONE HYDROCHLORIDE 0.4 MG: 1 INJECTION, SOLUTION INTRAMUSCULAR; INTRAVENOUS; SUBCUTANEOUS at 11:05

## 2020-07-29 RX ADMIN — ACETAMINOPHEN 1000 MG: 500 TABLET ORAL at 13:39

## 2020-07-29 RX ADMIN — ROCURONIUM BROMIDE 10 MG: 10 INJECTION, SOLUTION INTRAVENOUS at 08:30

## 2020-07-29 RX ADMIN — FENTANYL CITRATE 50 MCG: 50 INJECTION INTRAMUSCULAR; INTRAVENOUS at 10:31

## 2020-07-29 RX ADMIN — ONDANSETRON 4 MG: 2 INJECTION INTRAMUSCULAR; INTRAVENOUS at 10:17

## 2020-07-29 RX ADMIN — LIDOCAINE HYDROCHLORIDE 40 MG: 20 INJECTION, SOLUTION EPIDURAL; INFILTRATION; INTRACAUDAL at 07:42

## 2020-07-29 RX ADMIN — SODIUM CHLORIDE, POTASSIUM CHLORIDE, SODIUM LACTATE AND CALCIUM CHLORIDE: 600; 310; 30; 20 INJECTION, SOLUTION INTRAVENOUS at 07:38

## 2020-07-29 RX ADMIN — PROPOFOL 100 MG: 10 INJECTION, EMULSION INTRAVENOUS at 07:42

## 2020-07-29 RX ADMIN — DOCUSATE SODIUM 100 MG: 100 CAPSULE, LIQUID FILLED ORAL at 18:07

## 2020-07-29 RX ADMIN — OXYCODONE 5 MG: 5 TABLET ORAL at 17:55

## 2020-07-29 RX ADMIN — Medication 100 MCG: at 07:50

## 2020-07-29 RX ADMIN — FENTANYL CITRATE 100 MCG: 50 INJECTION INTRAMUSCULAR; INTRAVENOUS at 08:25

## 2020-07-29 RX ADMIN — ONDANSETRON 4 MG: 2 INJECTION INTRAMUSCULAR; INTRAVENOUS at 17:08

## 2020-07-29 RX ADMIN — SUGAMMADEX 200 MG: 100 INJECTION, SOLUTION INTRAVENOUS at 08:50

## 2020-07-29 RX ADMIN — SODIUM CHLORIDE, SODIUM GLUCONATE, SODIUM ACETATE, POTASSIUM CHLORIDE AND MAGNESIUM CHLORIDE: 526; 502; 368; 37; 30 INJECTION, SOLUTION INTRAVENOUS at 08:41

## 2020-07-29 RX ADMIN — OXYCODONE 5 MG: 5 TABLET ORAL at 13:39

## 2020-07-29 RX ADMIN — Medication 200 MCG: at 07:23

## 2020-07-29 RX ADMIN — Medication 100 MCG: at 07:42

## 2020-07-29 RX ADMIN — DEXAMETHASONE SODIUM PHOSPHATE 8 MG: 4 INJECTION, SOLUTION INTRA-ARTICULAR; INTRALESIONAL; INTRAMUSCULAR; INTRAVENOUS; SOFT TISSUE at 07:49

## 2020-07-29 RX ADMIN — SODIUM CHLORIDE, SODIUM GLUCONATE, SODIUM ACETATE, POTASSIUM CHLORIDE AND MAGNESIUM CHLORIDE: 526; 502; 368; 37; 30 INJECTION, SOLUTION INTRAVENOUS at 10:32

## 2020-07-29 RX ADMIN — Medication 100 MCG: at 07:53

## 2020-07-29 RX ADMIN — IBUPROFEN 800 MG: 200 TABLET, FILM COATED ORAL at 13:39

## 2020-07-29 RX ADMIN — OXYCODONE 10 MG: 5 TABLET ORAL at 21:03

## 2020-07-29 RX ADMIN — HYDROMORPHONE HYDROCHLORIDE 0.2 MG: 1 INJECTION, SOLUTION INTRAMUSCULAR; INTRAVENOUS; SUBCUTANEOUS at 12:15

## 2020-07-29 RX ADMIN — IBUPROFEN 800 MG: 200 TABLET, FILM COATED ORAL at 20:39

## 2020-07-29 RX ADMIN — Medication 200 MCG: at 07:33

## 2020-07-29 RX ADMIN — CALCIUM CHLORIDE 1 G: 100 INJECTION INTRAVENOUS; INTRAVENTRICULAR at 08:48

## 2020-07-29 RX ADMIN — GLYCOPYRROLATE 0.3 MG: 0.2 INJECTION INTRAMUSCULAR; INTRAVENOUS at 08:56

## 2020-07-29 RX ADMIN — EPHEDRINE SULFATE 10 MG: 50 INJECTION, SOLUTION INTRAVENOUS at 07:50

## 2020-07-29 RX ADMIN — OXYCODONE HYDROCHLORIDE 10 MG: 5 SOLUTION ORAL at 10:14

## 2020-07-29 RX ADMIN — FENTANYL CITRATE 25 MCG: 50 INJECTION INTRAMUSCULAR; INTRAVENOUS at 10:07

## 2020-07-29 RX ADMIN — SODIUM CHLORIDE, SODIUM GLUCONATE, SODIUM ACETATE, POTASSIUM CHLORIDE AND MAGNESIUM CHLORIDE: 526; 502; 368; 37; 30 INJECTION, SOLUTION INTRAVENOUS at 08:20

## 2020-07-29 RX ADMIN — SODIUM CHLORIDE, POTASSIUM CHLORIDE, SODIUM LACTATE AND CALCIUM CHLORIDE 1000 ML: 600; 310; 30; 20 INJECTION, SOLUTION INTRAVENOUS at 06:30

## 2020-07-29 RX ADMIN — CEFAZOLIN 2 G: 330 INJECTION, POWDER, FOR SOLUTION INTRAMUSCULAR; INTRAVENOUS at 07:41

## 2020-07-29 RX ADMIN — MIDAZOLAM HYDROCHLORIDE 2 MG: 1 INJECTION, SOLUTION INTRAMUSCULAR; INTRAVENOUS at 08:25

## 2020-07-29 SDOH — HEALTH STABILITY: MENTAL HEALTH: HOW OFTEN DO YOU HAVE A DRINK CONTAINING ALCOHOL?: MONTHLY OR LESS

## 2020-07-29 ASSESSMENT — FIBROSIS 4 INDEX: FIB4 SCORE: 0.67

## 2020-07-29 ASSESSMENT — ENCOUNTER SYMPTOMS
SHORTNESS OF BREATH: 0
FEVER: 0
HEADACHES: 0
NAUSEA: 1
VOMITING: 0
CHILLS: 0
BACK PAIN: 0
ABDOMINAL PAIN: 1

## 2020-07-29 ASSESSMENT — PAIN SCALES - GENERAL: PAIN_LEVEL: 8

## 2020-07-29 NOTE — ANESTHESIA TIME REPORT
Anesthesia Start and Stop Event Times     Date Time Event    7/29/2020 0730 Ready for Procedure     0738 Anesthesia Start     0919 Anesthesia Stop        Responsible Staff  07/29/20    Name Role Begin End    Junior Sharpe III, M.D. Anesth 0738 0919        Preop Diagnosis (Free Text):  Pre-op Diagnosis     Ruptured Ectopic        Preop Diagnosis (Codes):    Post op Diagnosis  Ruptured right tubal ectopic pregnancy causing hemoperitoneum      Premium Reason  Non-Premium    Comments:

## 2020-07-29 NOTE — ANESTHESIA PROCEDURE NOTES
Peripheral IV    Date/Time: 7/29/2020 7:40 AM  Performed by: Junior Sharpe III, M.D.  Authorized by: Junior Sharpe III, M.D.     Size:  18 G (long Jelco)  Laterality:  Left (hand)  Local Anesthetic:  None  Site Prep:  Alcohol  Technique:  Direct puncture  Attempts:  1   Placed by Dr MELINDA Felton

## 2020-07-29 NOTE — ANESTHESIA QCDR
2019 [facility] Clinical Data Registry (for Quality Improvement)     Postoperative nausea/vomiting risk protocol (Adult = 18 yrs and Pediatric 3-17 yrs)- (430 and 463)  General inhalation anesthetic (NOT TIVA) with PONV risk factors: Yes  Provision of anti-emetic therapy with at least 2 different classes of agents: Yes   Patient DID NOT receive anti-emetic therapy and reason is documented in Medical Record:  N/A    Multimodal Pain Management- (477)  Non-emergent surgery AND patient age >= 18: No  Use of Multimodal Pain Management, two or more drugs and/or interventions, NOT including systemic opioids:   Exception: Documented allergy to multiple classes of analgesics:     Smoking Abstinence (404)  Patient is current smoker (cigarette, pipe, e-cig, marijuanna): No  Elective Surgery:   Abstinence instructions provided prior to day of surgery:   Patient abstained from smoking on day of surgery:     Pre-Op Beta-Blocker in Isolated CABG (44)  Isolated CABG AND patient age >= 18: No  Beta-blocker admin within 24 hours of surgical incision:   Exception:of medical reason(s) for not administering beta blocker within 24 hours prior to surgical incision (e.g., not  indicated,other medical reason):     PACU assessment of acute postoperative pain prior to Anesthesia Care End- Applies to Patients Age = 18- (ABG7)  Initial PACU pain score is which of the following: < 7/10  Patient unable to report pain score: N/A    Post-anesthetic transfer of care checklist/protocol to PACU/ICU- (426 and 427)  Upon conclusion of case, patient transferred to which of the following locations: PACU/Non-ICU  Use of transfer checklist/protocol: Yes  Exclusion: Service Performed in Patient Hospital Room (and thus did not require transfer): N/A  Unplanned admission to ICU related to anesthesia service up through end of PACU care- (MD51)  Unplanned admission to ICU (not initially anticipated at anesthesia start time): No

## 2020-07-29 NOTE — ANESTHESIA PREPROCEDURE EVALUATION
Relevant Problems   OB   (+) History of  delivery affecting pregnancy   (+) S/P        Physical Exam    Airway   Mallampati: III  TM distance: >3 FB  Neck ROM: full       Cardiovascular - normal exam  Rhythm: regular  Rate: normal  (-) murmur     Dental - normal exam           Pulmonary - normal exam  Breath sounds clear to auscultation     Abdominal   (+) obese     Neurological - normal exam         Other findings: Acute ruptured right ectopic pregnancy with large hemoperitoneum and cardiovascular shock, hypotensive crisis, surgical emergency            Anesthesia Plan    ASA 5- EMERGENT       Plan - general       Airway plan will be ETT  (Cardiovascular shock, hypotension, surgical emergency, rapid infuser, red box)      Induction: intravenous    Postoperative Plan: Postoperative administration of opioids is intended.    Pertinent diagnostic labs and testing reviewed    Informed Consent:    Anesthetic plan and risks discussed with patient.    Use of blood products discussed with: patient whom consented to blood products.

## 2020-07-29 NOTE — OR SURGEON
Immediate Post OP Note    PreOp Diagnosis: ruptured ectopic pregnancy.   Unstable hypotensive.    PostOp Diagnosis: same.  Procedure(s):  LAPAROTOMY, OPEN, left SALPINGECTOMY - Wound Class: Clean    Surgeon(s):  HANNA Nguyễn M.D.    Anesthesiologist/Type of Anesthesia:  Anesthesiologist: Junior Sharpe III, M.D./General    Surgical Staff:  Circulator: Jose Gaytan R.N.; Ariel Finley R.N.; Zina Stubbs R.N.  Scrub Person: Rosa Mack    Specimens removed if any:  ID Type Source Tests Collected by Time Destination   A : Left Tissue Fallopian Tube PATHOLOGY SPECIMEN Breanna Argueta M.D. 7/29/2020  8:40 AM        Estimated Blood Loss: 3000ml of hemoperitoneum drained.    Findings:  Hemoperitoneum left tubal ruptured ectipic pregnancy.    Complications:  Unstable prior to surgery stabilised on blood and fluids.        7/29/2020 9:14 AM Breanna Argueta M.D.

## 2020-07-29 NOTE — ANESTHESIA PROCEDURE NOTES
Arterial Line  Performed by: Junior Sharpe III, M.D.  Authorized by: Junior Sharpe III, M.D.     Start Time:  7/29/2020 7:44 AM  End Time:  7/29/2020 7:45 AM  Localization: surface landmarks    Patient Location:  OR  Indication: continuous blood pressure monitoring        Catheter Size:  20 G  Seldinger Technique?: Yes    Laterality:  Right  Site:  Radial artery  Line Secured:  Antimicrobial disc, tape and transparent dressing  Events: patient tolerated procedure well with no complications     Placed by Dr MELINDA Felton

## 2020-07-29 NOTE — ED NOTES
Dillan from Lab called with critical result of POSITIVE pregnancy test result to correct the negative test result that was posted to pt's chart at 0650. Critical lab result read back to Dillan.   Dr. Lambert notified of critical lab result at 0650.  Critical lab result read back by Dr. Lambert.   2.12

## 2020-07-29 NOTE — OP REPORT
DATE OF SERVICE:  2020    INDICATIONS:  The patient is a 25-year-old  5, para 2-0-2-2 came in   with missed period with lower abdominal pain, feeling dizzy, faint, with   history of prior ruptured ectopic pregnancy.  When she came to the ER, she was   hypertensive.  Pregnancy test was positive.  Hemoglobin and hematocrit was 12   and 34, but the patient was hypertensive and tachycardic and deteriorating in   condition.  In view of unstable and suspected ruptured ectopic pregnancy on   ultrasound with free fluid and right adnexal mass, patient was consented for   laparotomy and was rushed to the OR.    PROCEDURES PERFORMED:  Laparotomy with left salpingectomy with draining of   hemoperitoneum.    SURGEON:  Breanna Argueta MD    ASSISTANT:  Kourtney Zaldivar MD    ANESTHESIA:  General endotracheal tube.    ANESTHESIOLOGIST:  Junior Sharpe MD    Preoperatively, patient was started on cross matched 2 units of blood and   intraoperatively, she got totally 4 units of crossed-matched blood with 4   units of FFP and 1 platelet.  The patient already had 3 liters of fluid in the   ER.  Intraoperatively, she got 1 liter of fluid.    COMPLICATIONS:  Preoperatively and intraoperatively was blood loss.    ESTIMATED BLOOD LOSS:  3 liters of hemoperitoneum was drained.    Intraoperatively, blood loss was minimal.  Total estimated blood loss was 3   liters.    FLUIDS:  Was 4 liters of IV fluids, 4 packed RBCs, 4 FFPs, and 1 platelet.    URINE OUTPUT:  300 mL of straw-colored urine was in the Carbajal prior to   transferring to the recovery room.    FINDINGS:  Uterus was bulky.  Right tube was missing in view of prior   salpingectomy, left tube at isthmo ampullary junction.  The anterior   mesosalpinx border was ruptured with bleeding and tissues of pregnancy oozing   out.    PROCEDURE IN DETAIL:  The patient was taken to the operating room, general   anesthesia was obtained without difficulty.  Patient already had been  started   on blood transfusion.  Second line was started.  Patient was placed in dorsal   lithotomy position, prepped and draped in the normal sterile fashion.  Bladder   was Carbajal catheterized.  Using a Pfannenstiel skin incision about 5 cm, using   the scalpel, carried down to the underlying layer of fascia with a scalpel.    Fascia was incised and incision was extended laterally on both the sides.    Rectus muscles were  in the midline.  Peritoneal cavity was entered   bluntly.  Hemoperitoneum was drained.  Riccardo retractor was introduced and   held in place.  Bowels were packed away with laps.  Bladder reflection was   carried out.  Using a [place], the right stump of the tube was held up and   explored.  Her ovaries were normal.  A rent in the posterior mesosalpinx was   treated.  Broad ligament was noted, but no bleeding was noted.    Attention was then turned towards the left side.  Left tube was held with the   Orlin and ruptured ectopic was noted on the anterior mesosalpinx border,   which was clamped using the Orlin.  Using the LigaSure, left salpingectomy   was carried out starting from the uterine cornu.  The left tube was clamped,   coagulated, and cut and this dissection was carried out parallel to the tube   throughout the entire length of the tube and the tube was brought out and sent   for pathology.  Bleeding was noted at the round ligament, which was clamped   and coagulated and also closer to the infundibulopelvic ligament, bleeding was   noted.  This was clamped and coagulated.  The uteroovarian ligament was   intact.  Pelvic cavity was copiously irrigated with 3 liters of fluid and   fluid was suctioned out.  Hemostasis was reaffirmed on the right side.  The   raw surfaces were infiltrated with Lv.    Then, attention was directed towards the right side and a rent in the broad   ligament was held with LigaSure, brought together clamping with pickup and   LigaSure, was  coagulated.  Hemostasis was attained.  Raw surfaces were   infiltrated with Lv.  Hemostasis was reaffirmed on all the pelvic organs.    No bleeding was noted.  All the laps were removed.  Counts were correct of   sponge, lap, and instrument count x3.  The peritoneum was closed with 2-0   chromic.  Rectus muscles was approximated in the midline.  Fascia was closed   with 0 Vicryl.  Subcutaneous layer was closed with 2-0 chromic and the skin   was closed with 4-0 Monocryl.  Sponge, lap, and instrument count were correct   x3.  The patient was extubated and transferred to the recovery room under   stable condition.       ____________________________________     MD DEEPAK Nguyễn / TAZ    DD:  07/29/2020 09:35:40  DT:  07/29/2020 10:36:53    D#:  [id number]  Job#:  [id number]

## 2020-07-29 NOTE — OR NURSING
Needs to go to ER immediately leaving clinic   Patient received from OR at 0913, patient lethargic, oriented to person, date and situation.  Hypotensive 89/35, , plasmalyte infusing per order. Dr Sharpe and Dr Argueta at bedside. Labs reviewed, Hgb 6.9, platelets 95. 2 PRBCs and 1 unit platelets ordered.     1015 Patient more awake, c/o 7-9/10 pelvic pain. /61, . Oral and IV pain medication administered. Patient c/o nausea, antiemetics given. Patient states she feels a little bit better. Platelets infusing.    1109- Patient AAOx4, c/o persistent pelvic pain. IV pain medication administered. Pain down to 6-7/10, patient states pain is tolerable. Resting comfortably in bed. VSS, afebrile. On 10L oxymask per ERAS. First unit PRBC infusing. Carbajal draining clear yellow urine. Lower abdominal dressing CDI, ice applied. Belongings at bedside. Patient states she will contact boyfriend later.     Report given to Rosa TURNER.

## 2020-07-29 NOTE — DISCHARGE PLANNING
Medical Social Work    MSW was called back to bedside as pt is going to OR for an ectopic pregnancy.  MSW was able to assist pt in calling her boyfriend, Delicia and her father, Kobe ([phone] or [phone]) as pt's mom, Audelia was at work.  Pt was able to update family.  Emotional support provided.

## 2020-07-29 NOTE — ANESTHESIA PROCEDURE NOTES
Airway    Date/Time: 7/29/2020 7:42 AM  Performed by: Junior Sharpe III, M.D.  Authorized by: Junior Sharpe III, M.D.     Location:  OR  Urgency:  Emergent  Difficult Airway: No    Consent Cannot be Obtained Due to Urgency: No    Verbal Consent Obtained: Yes    Consent Given By:  Patient  Patient Identity Confirmed by:  Verbally with patient  Indications for Airway Management:  Anesthesia and cardiovascular instability      Spontaneous Ventilation: present    Sedation Level:  No sedation  Preoxygenated: Yes    Patient Position:  Sniffing  MILS Maintained Throughout: No    Mask Difficulty Assessment:  0 - not attempted  Final Airway Type:  Endotracheal airway  Final Endotracheal Airway:  ETT  Cuffed: Yes    Technique Used for Successful ETT Placement:  Direct laryngoscopy  Devices/Methods Used in Placement:  Anterior pressure/BURP    Insertion Site:  Oral  Blade Type:  Ann  Laryngoscope Blade/Videolaryngoscope Blade Size:  2  ETT Size (mm):  7.5  Measured from:  Lips  ETT to Lips (cm):  22  Placement Verified by: auscultation and capnometry    Cormack-Lehane Classification:  Grade III - view of epiglottis only  Number of Attempts at Approach:  1  Number of Other Approaches Attempted:  0

## 2020-07-29 NOTE — ED NOTES
Pt was able to get ahold of her boyfriend and her dad to let them know that she is going into surgery for a ruptured ectopic pregnancy.

## 2020-07-29 NOTE — H&P
Date: 2020.    Requesting Physician: Dr. Tan.      Attending Physician: Kendall Carlos .M.D      CC: came with pelvic pain and feeling dizzy..    HPI: h/o ruptured ectopic pregnancy few years ago. Came with pelvic pain sudden onset feeling dizzy.      c/s x2.   right salpingectomy for ruptured ectopic pregnancy in 2018. Miscarriage x 1.   unplanned pregnancy.        Social History     Socioeconomic History   • Marital status: Legally      Spouse name: Not on file   • Number of children: Not on file   • Years of education: Not on file   • Highest education level: Not on file   Occupational History   • Not on file   Social Needs   • Financial resource strain: Not on file   • Food insecurity     Worry: Not on file     Inability: Not on file   • Transportation needs     Medical: Not on file     Non-medical: Not on file   Tobacco Use   • Smoking status: Current Every Day Smoker     Packs/day: 1.00     Years: 10.00     Pack years: 10.00     Types: Cigarettes     Start date: 2015   • Smokeless tobacco: Never Used   Substance and Sexual Activity   • Alcohol use: Yes     Frequency: Monthly or less   • Drug use: Yes     Types: Inhaled     Comment: marijuana   • Sexual activity: Yes     Partners: Male     Comment: Unplanned pregnancy   Lifestyle   • Physical activity     Days per week: Not on file     Minutes per session: Not on file   • Stress: Not on file   Relationships   • Social connections     Talks on phone: Not on file     Gets together: Not on file     Attends [employer] service: Not on file     Active member of club or organization: Not on file     Attends meetings of clubs or organizations: Not on file     Relationship status: Not on file   • Intimate partner violence     Fear of current or ex partner: Not on file     Emotionally abused: Not on file     Physically abused: Not on file     Forced sexual activity: Not on file   Other Topics Concern   • Not on file   Social History  Narrative   • Not on file       Active Ambulatory Problems     Diagnosis Date Noted   • History of  delivery affecting pregnancy 2018   • Trichomoniasis 05/15/2018   • Supervision of other high risk pregnancies, third trimester 05/15/2018   • PUPPP (pruritic urticarial papules and plaques of pregnancy) 2018   • S/P  2018     Resolved Ambulatory Problems     Diagnosis Date Noted   • No Resolved Ambulatory Problems     Past Medical History:   Diagnosis Date   • Ectopic pregnancy 2018   • Fibroid tumor    • Inflammatory disease of ovary, fallopian tube, pelvic cellular tissue, and peritoneum    • Miscarriage 2020       No current facility-administered medications on file prior to encounter.      No current outpatient medications on file prior to encounter.       Past Surgical History:   Procedure Laterality Date   • PELVISCOPY  2018    Procedure: DIAGNOSTIC LAPAROSCOPY, DRAINAGE OF HEMOPERITONEUM;  Surgeon: Breanna Argueta M.D.;  Location: SURGERY [facility];  Service: Gynecology   • SALPINGECTOMY Right 2018    Procedure: SALPINGECTOMY;  Surgeon: Breanna Argueta M.D.;  Location: SURGERY [facility];  Service: Gynecology   • DILATION AND CURETTAGE  2018    Procedure: DILATION AND CURETTAGE;  Surgeon: Breanna Argueta M.D.;  Location: SURGERY [facility];  Service: Gynecology   • REPEAT C SECTION  2018    Procedure: REPEAT C SECTION;  Surgeon: Erich Beltran M.D.;  Location: LABOR AND DELIVERY;  Service: Labor and Delivery   • REPEAT C SECTION  2015    Performed by Erich Beltran M.D. at LABOR AND DELIVERY   • PRIMARY C SECTION  2012   • GYN SURGERY                 Allergies:   Pcn [penicillins]  Review of Systems:   Constitutional: Negative for fever, chills, weight loss, malaise/fatigue and diaphoresis.   HENT: Negative for hearing loss, ear pain, nosebleeds, congestion, sore throat, neck pain, tinnitus and ear  discharge.   Eyes: Negative for blurred vision, double vision, photophobia, pain, discharge and redness.   Respiratory: Negative for cough, hemoptysis, sputum production, shortness of breath, wheezing and stridor.   Cardiovascular: Negative for  orthopnea, claudication, leg swelling and PND.   C/o chest pain, palpitations, when going inside OR.  Gastrointestinal:  Lower abdominal pain.  Genitourinary: Negative for dysuria, urgency, frequency, hematuria and flank pain.   Musculoskeletal: Negative for myalgias, back pain, joint pain and falls.   Skin: Negative for itching and rash.   Neurological: Negative for dizziness, tingling, tremors, sensory change, speech change, focal weakness, seizures, loss of consciousness, weakness and headaches.   Endo/Heme/Allergies: Negative for environmental allergies and polydipsia. Does not bruise/bleed easily.   Psychiatric/Behavioral: Negative for depression, suicidal ideas, hallucinations, memory loss and substance abuse. The patient is not nervous/anxious and does not have insomnia.   Physical Exam:     Vitals:    07/29/20 0715 07/29/20 0716 07/29/20 0720 07/29/20 0725   BP:  (!) 86/44 (!) 65/27 (!) 61/30   Pulse: (!) 104 (!) 105 (!) 113    Resp: (!) 22 20 19 17   Temp:       TempSrc:       SpO2: 100% 99% 97% 100%   Weight:       Height:           Constitutional: she is oriented to person, place, and time. Is in acute distress   very pale  O2 mainatained. But feeling dizzy. she appears well-developed and well-nourished. Mild distress.   Head: Normocephalic and atraumatic.   Neck: Normal range of motion. Neck supple. No JVD present. No tracheal deviation present. No thyromegaly present.   Cardiovascular: Normal rate, tachycardiac  rhythm, weak pulse.normal heart sounds and intact distal pulses. Exam reveals no gallop and no friction rub. No murmur heard.   Pulmonary/Chest: Effort normal and breath sounds normal. No stridor. No respiratory distress. she has no wheezes. She has no  rales.     Abdominal: Soft. There is no tenderness. There is rebound and no guarding.  Pelvic exam:defffered.    Musculoskeletal: Normal range of motion. she exhibits no edema and no tenderness.   Neurological: she is alert and oriented to person, place, and time. she has normal reflexes. No cranial nerve deficit. Coordination normal.   Skin: Skin is cold clammy. No rash noted. She is diaphoretic.  Severe  pallor.   Psychiatric: she has a normal mood and affect. Behavior is normal.   Labs:   Recent Labs     20  0600 20  0803   WBC 8.3 11.9*   RBC 3.95* 2.48*   HEMOGLOBIN 12.2 7.5*   HEMATOCRIT 37.5 23.4*   MCV 94.9 94.0   MCH 30.9 30.2   RDW 43.7 48.7   PLATELETCT 198 114*   MPV 10.8 10.8   NEUTSPOLYS 60.80  --    LYMPHOCYTES 32.20  --    MONOCYTES 5.40  --    EOSINOPHILS 0.70  --    BASOPHILS 0.40  --       USg free fluid in pelvis   right complex adnexal mass.      Radiology:      No intra-uterine gestation is identified.     The right ovary measures 2.4 x 1.9 x 1.6 cm. Doppler flow in the right ovary is observed. There is heterogeneous hypoechoic fluid in the right adnexa extending adjacent to the uterus.     The left ovary is not visualized, surgically absent by history.     IMPRESSION:        1.  No intrauterine pregnancy identified. Heterogeneous fluid in the right adnexa tracking adjacent to the uterus, appearance raises concern for occult ectopic pregnancy     Assessment:   26 y/o    c/s x 2.   Right salpingectomy for ruptured ectopic pregnancy.   with ruptured ectopic pregnancy hemodynamically unstable.   rushing to her OR for emergency laparotomy verbally consented for laparotomy and unilateral salpingectomy, treatment of ectopic pregnancy need for blood transfusion, and explained pt will be sterile after salpingectomy and not able to get pregnant any more pt confirms understanding.        Plan:    The above plan was reviewed thoroughly with the   Patient and the nurse. All questions  were answered to   their satisfaction. Rushing to OR.          Thank you very much for this consultation.

## 2020-07-29 NOTE — PROGRESS NOTES
"1315: Patient arrived to unit from [facility] on [facility]. Able to transfer herself from PACU bed to postpartum bed without difficulty. Patient very fatigued, unit of PRBC's currently running with NS piggyback. Patient complaining of extreme throbbing/soreness on lower abdomen/incision. Educated patient on incision care including splinting and ice packs. Pain meds due at 1345. Oriented to unit and protocols, and orders per MD for her hospital course. Patient in agreement with plan, and would like to rest. Ice water given, O2 via oxymask at 10L/min administered.    1340: Pain meds given, see MAR. Patient complaining of severe shoulder pain. Patient states the pain is so severe especially upon inhalation, so patient refusing to perform IS at this time, and is only able to reach 500 ml. Left message for MD for possible simethicone order.     1415: 2/2 unit of PRBC started and verified with REBEKA Fischer RN. Vitals and patient stable.    1430: No s/sx of transfusion reaction, patient asleep still wearing O2 mask.    1530: Patient asleep, VSS.    1625: Transfusion complete. All 3 IV's flushed, 1 removed see flow sheets. Patient states she feels a lot better, but still tired. Patient complaining that pain in neck/shoulder is getting worse. Attempted to call MD again, but office closed. After hour service called and unable to reach MD. Voicemail message left by answering service to call RN back.    1700: Called to room due to patient feeling like she needs to vomit. Showed how to splint incision, sitting up in bed. \"It's like I can feel it in my neck and my throat. It's right there. I don't want to choke.\" Emotional support provided. No emesis observed. Educated patient that this still may be the gas pain in addition to nausea.     1715: Zofran given. Encouraged patient to call if nausea does not resolve.    1730: Dr. Argueta called again by RN and answering service. Unable to reach MD. Another voicemail was left.    1740: Received " call back from Dr. Argueta. Orders received for Simethicone TID. Also received order to d/c senjonaht but continue colace. Also per MD, dent can come out if tomorrow CBC results show Hemoglobin of greater than 8. Orders placed.    1750: Report given to Melvin TURNER.

## 2020-07-29 NOTE — ED TRIAGE NOTES
Chief Complaint   Patient presents with   • Abdominal Pain     sudden onset of central upper abdominal pain at 0400   • Vomiting     since 0400     Pt brought in from home by EMS for above complaint. Pt received 400ml NS and 4mg Zofran IVP PTA.    Pt has hx of ectopic pregnancy in Dec 2018 and also had a miscarriage earlier this year. Pt does not know if she is currently pregnant and is unsure of LMP.

## 2020-07-29 NOTE — ED PROVIDER NOTES
"ED Provider Note    ED Provider Note    Primary care provider: Pcp Pt States None  Means of arrival: EMS  History obtained from: patient  History limited by: None    CHIEF COMPLAINT  Chief Complaint   Patient presents with   • Abdominal Pain     sudden onset of central upper abdominal pain at 0400   • Vomiting     since 0400       HPI  Natalie Pop is a 25 y.o. female who presents to the Emergency Department via EMS.  Patient states that she was in her normal state of health yesterday.  She woke up this morning at about 4 AM with sudden onset of diffuse abdominal pain.  It has worsened since that time, prompting a call to 911.  She presents to the emergency department hypotensive, pale.  Patient states that she had a \"miscarriage\" in May of this year and then an abnormal period in June.  She describes it as less flow than normal.  She has a history of a left-sided ectopic pregnancy where she lost her fallopian tube in 2018.  She denies any fever.  He is otherwise been in her normal state of health and denies any known sick contacts.  No COVID-19 exposures.    REVIEW OF SYSTEMS  Review of Systems   Constitutional: Negative for chills and fever.   HENT: Negative for congestion.    Respiratory: Negative for shortness of breath.    Cardiovascular: Negative for chest pain.   Gastrointestinal: Positive for abdominal pain and nausea. Negative for vomiting.   Genitourinary: Negative for dysuria.   Musculoskeletal: Negative for back pain.   Neurological: Negative for headaches.   All other systems reviewed and are negative.      PAST MEDICAL HISTORY   has a past medical history of Ectopic pregnancy (12/2018), Fibroid tumor, Inflammatory disease of ovary, fallopian tube, pelvic cellular tissue, and peritoneum, and Miscarriage (05/2020).    SURGICAL HISTORY   has a past surgical history that includes gyn surgery (2012); repeat c section (4/13/2015); primary c section (11/04/2012); repeat c section (5/31/2018); " pelviscopy (12/20/2018); salpingectomy (Right, 12/20/2018); and dilation and curettage (12/20/2018).    SOCIAL HISTORY  Social History     Tobacco Use   • Smoking status: Current Every Day Smoker     Packs/day: 1.00     Years: 10.00     Pack years: 10.00     Types: Cigarettes     Start date: 1/13/2015   • Smokeless tobacco: Never Used   Substance Use Topics   • Alcohol use: Yes     Frequency: Monthly or less   • Drug use: Yes     Types: Inhaled     Comment: marijuana      Social History     Substance and Sexual Activity   Drug Use Yes   • Types: Inhaled    Comment: marijuana       FAMILY HISTORY  Family History   Problem Relation Age of Onset   • No Known Problems Paternal Grandmother        CURRENT MEDICATIONS  Home Medications     Reviewed by Jose Gaytan R.N. (Registered Nurse) on 07/29/20 at 0723  Med List Status: <None>   Medication Last Dose Status        Patient Stanley Taking any Medications                       ALLERGIES  Allergies   Allergen Reactions   • Pcn [Penicillins] Hives       PHYSICAL EXAM  VITAL SIGNS: BP (!) 99/55   Pulse 83   Temp 36.5 °C (97.7 °F)   Resp 14   Ht 1.829 m (6')   Wt 90.7 kg (200 lb)   LMP  (LMP Unknown) Comment: Miscarriage in May 2020  SpO2 100%   Breastfeeding Unknown   BMI 27.12 kg/m²   Vitals reviewed.  Constitutional: Patient is oriented to person, place, and time.  Ill-appearing.  Pale.  Head: Normocephalic and atraumatic.   Ears: Normal external ears bilaterally.   Mouth/Throat: Oropharynx is clear and moist  Eyes: Conjunctivae are normal. Pupils are equal, round, and reactive to light.   Neck: Normal range of motion. Neck supple.  Cardiovascular: Normal heart rate, regular rhythm and normal heart sounds. Normal peripheral pulses.  Pulmonary/Chest: Effort normal and breath sounds normal. No respiratory distress, no wheezes, rhonchi, or rales.   Abdominal: Abdomen is mildly distended.  Bowel sounds are distant.  Patient has diffuse tenderness with peritoneal  signs.  There is voluntary guarding.  Pelvic: no vaginal bleeding. normal external genitalia  Musculoskeletal: No edema and no tenderness.   Neurological: No focal deficits.   Skin: Skin is warm and dry. No erythema.  Psychiatric: Patient has a normal mood and affect.     LABS  Initial labs show normal white blood cell count 8.3.  H&H 12 and 37.  Platelet count normal 198.  Chemistry shows a slightly low potassium 3.3.  Glucose elevated 182.  AST low at 11, lactic acid 2.0.      All labs reviewed by me.    RADIOLOGY  US-OB 1ST TRIMESTER SINGLE GEST Is the patient pregnant? Unknown   Final Result         1.  No intrauterine pregnancy identified. Heterogeneous fluid in the right adnexa tracking adjacent to the uterus, appearance raises concern for occult ectopic pregnancy      These findings were discussed with the patient's clinician, Sobeida Lambert, on 7/29/2020 6:55 AM.        The radiologist's interpretation of all radiological studies have been reviewed by me.    COURSE & MEDICAL DECISION MAKING  Pertinent Labs & Imaging studies reviewed. (See chart for details)    Obtained and reviewed past medical records.  Patient underwent an exploratory laparotomy for hemoperitoneum in 2018 after a ruptured ectopic pregnancy.  2 other encounters for C-sections.    6:22 AM - Patient seen and examined at bedside.  I was called to the bedside by nursing staff shortly after patient's arrival.  Patient is hypotensive, pale, presenting with abdominal pain.  She has a very diffuse Jordan tender abdomen.  Reporting a miscarriage in May but an abnormal period in June.  I have a high suspicion for ectopic pregnancy, ruptured based on presentation.  No preceding symptoms or fever.  This came on suddenly.  Patient was straight cathed for urine which came back positive.  I had initially, plan to send the patient for CT scanning but once she had a positive pregnancy test ultrasound was called.  My own bedside ultrasound, shows no evidence of  intrauterine pregnancy.  There is complex fluid and free fluid noted around the uterus.  Ultrasound called for formal imaging.  Patient's had 2 large-bore IVs placed.  IV fluids infusing.  About 400 cc of NS given prior to arrival by EMS.  Completion of that liter and an additional 2 L, given for hypotension in the emergency department.  Patient was typed and screened.  COVID sent in anticipation of going to the operating room.    0640AM D/W Dr. Helms, US, no not formally read by radiology, he is highly concerned for ruptuted ectopic.  She agrees to come emergently to the ER to evaluate the patient.  She will call the operating room to arrange time.    0656 D/W radiology regarding ultrasound findings, highly concerning for ruptured ectopic. Patient awaiting OR. Hypotensive IVF infusing patient updated on plan.     0713AM SBP 92, report to OR by nursing staff.     0725AM Dr. Helms Gyn at bedside, SBP now 60's,  push dose pressors give patient prior to transport to  OR with nursing staff. Blood ordered for OR.     0730AM patient to OR.  Blood bank called to ensure that blood products will be available.  A red box has been ordered and they are aware, patient has been transferred to the OR and blood products will be sent there.    Patient is admitted in critical condition.    The total critical care time on this patient is 120 minutes, resuscitating patient, multiple reevaluations, speaking with admitting physician, and deciphering test results. This 120 minutes is exclusive of separately billable procedures.      FINAL IMPRESSION  #1 right sided ruptured ectopic pregnancy  #2 hemoperitoneum  #3 abdominal pain, diffuse  Critical care time: 120minutes

## 2020-07-29 NOTE — DISCHARGE PLANNING
Medical Social Work    MSW responded to RD08.  Pt is a 25 year old brought in by [facility] from home for abdominal pain.  Pt is Natalie Pop (: 1995).  Pt was provided with emotional support.  Pt states that she does NOT want family contacted at this time.  Per chart pt's emergency contacts are her mom, Audelia ([phone]) and her significant other Delicia ([phone]).  No family contacted per pt's request.  SW will remain available as needed.

## 2020-07-29 NOTE — OR NURSING
Pt to pre-op with ER RNs. Dr. Argueta at bedside; wants pt taken back immediately; emergent procedure. Unable to complete WHO checklist. Pt taken back to OR with OR charge and OR RN.

## 2020-07-30 LAB
B-HCG SERPL-ACNC: 1718 MIU/ML (ref 0–5)
BASOPHILS # BLD AUTO: 0 % (ref 0–1.8)
BASOPHILS # BLD: 0 K/UL (ref 0–0.12)
EOSINOPHIL # BLD AUTO: 0.02 K/UL (ref 0–0.51)
EOSINOPHIL NFR BLD: 0.4 % (ref 0–6.9)
ERYTHROCYTE [DISTWIDTH] IN BLOOD BY AUTOMATED COUNT: 49.9 FL (ref 35.9–50)
HCT VFR BLD AUTO: 26.4 % (ref 37–47)
HGB BLD-MCNC: 9.2 G/DL (ref 12–16)
IMM GRANULOCYTES # BLD AUTO: 0.02 K/UL (ref 0–0.11)
IMM GRANULOCYTES NFR BLD AUTO: 0.4 % (ref 0–0.9)
LYMPHOCYTES # BLD AUTO: 1.11 K/UL (ref 1–4.8)
LYMPHOCYTES NFR BLD: 21.4 % (ref 22–41)
MCH RBC QN AUTO: 30.2 PG (ref 27–33)
MCHC RBC AUTO-ENTMCNC: 34.8 G/DL (ref 33.6–35)
MCV RBC AUTO: 86.6 FL (ref 81.4–97.8)
MONOCYTES # BLD AUTO: 0.56 K/UL (ref 0–0.85)
MONOCYTES NFR BLD AUTO: 10.8 % (ref 0–13.4)
NEUTROPHILS # BLD AUTO: 3.48 K/UL (ref 2–7.15)
NEUTROPHILS NFR BLD: 67 % (ref 44–72)
NRBC # BLD AUTO: 0 K/UL
NRBC BLD-RTO: 0 /100 WBC
PLATELET # BLD AUTO: 123 K/UL (ref 164–446)
PMV BLD AUTO: 10.8 FL (ref 9–12.9)
RBC # BLD AUTO: 3.05 M/UL (ref 4.2–5.4)
WBC # BLD AUTO: 5.2 K/UL (ref 4.8–10.8)

## 2020-07-30 PROCEDURE — 36415 COLL VENOUS BLD VENIPUNCTURE: CPT

## 2020-07-30 PROCEDURE — 700111 HCHG RX REV CODE 636 W/ 250 OVERRIDE (IP): Performed by: OBSTETRICS & GYNECOLOGY

## 2020-07-30 PROCEDURE — 85025 COMPLETE CBC W/AUTO DIFF WBC: CPT

## 2020-07-30 PROCEDURE — A9270 NON-COVERED ITEM OR SERVICE: HCPCS | Performed by: OBSTETRICS & GYNECOLOGY

## 2020-07-30 PROCEDURE — 302131 K PAD MOTOR: Performed by: OBSTETRICS & GYNECOLOGY

## 2020-07-30 PROCEDURE — 700112 HCHG RX REV CODE 229: Performed by: OBSTETRICS & GYNECOLOGY

## 2020-07-30 PROCEDURE — 770001 HCHG ROOM/CARE - MED/SURG/GYN PRIV*

## 2020-07-30 PROCEDURE — 84702 CHORIONIC GONADOTROPIN TEST: CPT

## 2020-07-30 PROCEDURE — 302152 K-PAD 12X17: Performed by: OBSTETRICS & GYNECOLOGY

## 2020-07-30 PROCEDURE — 700102 HCHG RX REV CODE 250 W/ 637 OVERRIDE(OP): Performed by: OBSTETRICS & GYNECOLOGY

## 2020-07-30 RX ADMIN — DOCUSATE SODIUM 100 MG: 100 CAPSULE, LIQUID FILLED ORAL at 07:55

## 2020-07-30 RX ADMIN — DOCUSATE SODIUM 100 MG: 100 CAPSULE, LIQUID FILLED ORAL at 19:57

## 2020-07-30 RX ADMIN — ACETAMINOPHEN 1000 MG: 500 TABLET ORAL at 01:54

## 2020-07-30 RX ADMIN — IBUPROFEN 800 MG: 200 TABLET, FILM COATED ORAL at 07:54

## 2020-07-30 RX ADMIN — OXYCODONE 10 MG: 5 TABLET ORAL at 16:50

## 2020-07-30 RX ADMIN — ACETAMINOPHEN 1000 MG: 500 TABLET ORAL at 14:49

## 2020-07-30 RX ADMIN — IBUPROFEN 800 MG: 200 TABLET, FILM COATED ORAL at 14:48

## 2020-07-30 RX ADMIN — HYDROMORPHONE HYDROCHLORIDE 0.5 MG: 1 INJECTION, SOLUTION INTRAMUSCULAR; INTRAVENOUS; SUBCUTANEOUS at 14:45

## 2020-07-30 RX ADMIN — ACETAMINOPHEN 1000 MG: 500 TABLET ORAL at 07:54

## 2020-07-30 RX ADMIN — OXYCODONE 10 MG: 5 TABLET ORAL at 19:57

## 2020-07-30 RX ADMIN — OXYCODONE 10 MG: 5 TABLET ORAL at 01:55

## 2020-07-30 RX ADMIN — IBUPROFEN 800 MG: 200 TABLET, FILM COATED ORAL at 19:58

## 2020-07-30 RX ADMIN — OXYCODONE 10 MG: 5 TABLET ORAL at 12:33

## 2020-07-30 RX ADMIN — ACETAMINOPHEN 1000 MG: 500 TABLET ORAL at 19:57

## 2020-07-30 RX ADMIN — OXYCODONE 10 MG: 5 TABLET ORAL at 07:54

## 2020-07-30 RX ADMIN — SIMETHICONE 80 MG: 80 TABLET, CHEWABLE ORAL at 10:56

## 2020-07-30 RX ADMIN — OXYCODONE 10 MG: 5 TABLET ORAL at 23:53

## 2020-07-30 NOTE — PROGRESS NOTES
Natalie Pop POD 1 S/P LAPOROTOMY AND LEFT SALPINGECTOMY FOR RUPTURED ECTOPIC PREGNANCY.    Subjective: Abdominal pain. yes, ambulating .no, tolerating liquids .yes, tolerating regular diet .yes, flatus.no, BM .no, Bleeding .no, voiding .yes,dizziness .no,V[place] IN PLACE,    /53   Pulse 64   Temp 36.4 °C (97.5 °F) (Temporal)   Resp 16   Ht 1.829 m (6')   Wt 90.7 kg (200 lb)   SpO2 96%   Abdomen soft, non-tender. BS normal. No masses,  No organomegaly  Incision: healing well with good reapproximation  Perineumperineum intact  ExtremitiesNormal    Meds:   No current facility-administered medications on file prior to encounter.      No current outpatient medications on file prior to encounter.       Lab:   Recent Results (from the past 48 hour(s))   BETA-HCG QUALITATIVE SERUM    Collection Time: 07/29/20  6:00 AM   Result Value Ref Range    Beta-Hcg Qualitative Serum Positive (A) Negative   CBC WITH DIFFERENTIAL    Collection Time: 07/29/20  6:00 AM   Result Value Ref Range    WBC 8.3 4.8 - 10.8 K/uL    RBC 3.95 (L) 4.20 - 5.40 M/uL    Hemoglobin 12.2 12.0 - 16.0 g/dL    Hematocrit 37.5 37.0 - 47.0 %    MCV 94.9 81.4 - 97.8 fL    MCH 30.9 27.0 - 33.0 pg    MCHC 32.5 (L) 33.6 - 35.0 g/dL    RDW 43.7 35.9 - 50.0 fL    Platelet Count 198 164 - 446 K/uL    MPV 10.8 9.0 - 12.9 fL    Neutrophils-Polys 60.80 44.00 - 72.00 %    Lymphocytes 32.20 22.00 - 41.00 %    Monocytes 5.40 0.00 - 13.40 %    Eosinophils 0.70 0.00 - 6.90 %    Basophils 0.40 0.00 - 1.80 %    Immature Granulocytes 0.50 0.00 - 0.90 %    Nucleated RBC 0.00 /100 WBC    Neutrophils (Absolute) 5.03 2.00 - 7.15 K/uL    Lymphs (Absolute) 2.67 1.00 - 4.80 K/uL    Monos (Absolute) 0.45 0.00 - 0.85 K/uL    Eos (Absolute) 0.06 0.00 - 0.51 K/uL    Baso (Absolute) 0.03 0.00 - 0.12 K/uL    Immature Granulocytes (abs) 0.04 0.00 - 0.11 K/uL    NRBC (Absolute) 0.00 K/uL   Comp Metabolic Panel    Collection Time: 07/29/20  6:00 AM   Result Value Ref  Range    Sodium 139 135 - 145 mmol/L    Potassium 3.3 (L) 3.6 - 5.5 mmol/L    Chloride 104 96 - 112 mmol/L    Co2 21 20 - 33 mmol/L    Anion Gap 14.0 7.0 - 16.0    Glucose 182 (H) 65 - 99 mg/dL    Bun 13 8 - 22 mg/dL    Creatinine 1.05 0.50 - 1.40 mg/dL    Calcium 8.4 (L) 8.5 - 10.5 mg/dL    AST(SGOT) 11 (L) 12 - 45 U/L    ALT(SGPT) 10 2 - 50 U/L    Alkaline Phosphatase 46 30 - 99 U/L    Total Bilirubin 0.5 0.1 - 1.5 mg/dL    Albumin 3.7 3.2 - 4.9 g/dL    Total Protein 5.7 (L) 6.0 - 8.2 g/dL    Globulin 2.0 1.9 - 3.5 g/dL    A-G Ratio 1.9 g/dL   LACTIC ACID    Collection Time: 07/29/20  6:00 AM   Result Value Ref Range    Lactic Acid 2.0 0.5 - 2.0 mmol/L   COD - Adult (Type and Screen)    Collection Time: 07/29/20  6:00 AM   Result Value Ref Range    ABO Grouping Only A     Rh Grouping Only POS     Antibody Screen-Cod NEG     Component R       R99                 Red Cells, LR       [id number]   transfused   07/29/20   10:59      Product Type R99     Dispense Status transfused     Unit Number (Barcoded) [id number]     Product Code (Barcoded) I2873T88     Blood Type (Barcoded) 6200     Component R       R99                 Red Cells, LR       [id number]   transfused   07/29/20   14:09      Product Type R99     Dispense Status transfused     Unit Number (Barcoded) [id number]     Product Code (Barcoded) W5872S04     Blood Type (Barcoded) 6200    ESTIMATED GFR    Collection Time: 07/29/20  6:00 AM   Result Value Ref Range    GFR If African American >60 >60 mL/min/1.73 m 2    GFR If Non African American >60 >60 mL/min/1.73 m 2   MASSIVE TRANSFUSION    Collection Time: 07/29/20  6:00 AM   Result Value Ref Range    Component R       R99                 Red Cells, LR       [id number]   transfused   07/29/20   07:35      Product Type R99     Dispense Status transfused     Unit Number (Barcoded) [id number]     Product Code (Barcoded) J6516V53     Blood Type (Barcoded) 6200     Component R       R99                  Red Cells, LR       [id number]   transfused   07/29/20   07:35      Product Type R99     Dispense Status transfused     Unit Number (Barcoded) [id number]     Product Code (Barcoded) T3714P32     Blood Type (Barcoded) 6200     Component R       R99                 Red Cells, LR       [id number]   transfused   07/29/20   07:35      Product Type R99     Dispense Status transfused     Unit Number (Barcoded) [id number]     Product Code (Barcoded) A7184N80     Blood Type (Barcoded) 6200     Component R       R99                 Red Cells, LR       [id number]   transfused   07/29/20   07:35      Product Type R99     Dispense Status transfused     Unit Number (Barcoded) [id number]     Product Code (Barcoded) W4990X29     Blood Type (Barcoded) 6200     Component F       TA2                 Thawed Plasma 2     [id number]   transfused   07/29/20   07:35      Product Type Thawed Apheresis Plasma 2nd Cont     Dispense Status transfused     Unit Number (Barcoded) [id number]     Product Code (Barcoded) P6923Q41     Blood Type (Barcoded) 8400     Component Ft       FPT                 Plasma, Thawed      [id number]   transfused   07/29/20   07:35      Product Type Plasma  Thawed     Dispense Status transfused     Unit Number (Barcoded) [id number]     Product Code (Barcoded) B3907U04     Blood Type (Barcoded) 6200     Component F       TA                  Thawed Plasma       [id number]   transfused   07/29/20   07:35      Product Type Thawed Apheresis Plasma     Dispense Status transfused     Unit Number (Barcoded) [id number]     Product Code (Barcoded) H2994Z21     Blood Type (Barcoded) 6200     Component F       TA2                 Thawed Plasma 2     [id number]   transfused   07/29/20   07:35      Product Type Thawed Apheresis Plasma 2nd Cont     Dispense Status transfused     Unit Number (Barcoded) [id number]     Product Code (Barcoded) X2643B20     Blood Type (Barcoded) 6200      Component P       P0                  Plts,Pheresis       [id number]   transfused   07/29/20   07:35      Product Type Platelets  Pheresis LR     Dispense Status transfused     Unit Number (Barcoded) [id number]     Product Code (Barcoded) D5554L35     Blood Type (Barcoded) 6200    POC URINE PREGNANCY    Collection Time: 07/29/20  6:16 AM   Result Value Ref Range    POC Urine Pregnancy Test Positive (A) Negative   URINALYSIS,CULTURE IF INDICATED    Collection Time: 07/29/20  6:25 AM    Specimen: Urine, Straight Cath; Blood   Result Value Ref Range    Color Yellow     Character Clear     Ph 6.0 5.0 - 8.0    Glucose Negative Negative mg/dL    Ketones Negative Negative mg/dL    Protein 30 (A) Negative mg/dL    Bilirubin Small (A) Negative    Urobilinogen, Urine 0.2 Negative    Nitrite Negative Negative    Leukocyte Esterase Negative Negative    Occult Blood Negative Negative    Micro Urine Req Microscopic    REFRACTOMETER SG    Collection Time: 07/29/20  6:25 AM   Result Value Ref Range    Specific Gravity 1.031    URINE MICROSCOPIC (W/UA)    Collection Time: 07/29/20  6:25 AM   Result Value Ref Range    WBC 0-2 /hpf    RBC 0-2 /hpf    Bacteria Negative None /hpf    Epithelial Cells Negative /hpf    Ca Oxalate Crystal Moderate /hpf    Hyaline Cast 11-20 (A) /lpf   COVID/SARS CoV-2 PCR    Collection Time: 07/29/20  6:30 AM    Specimen: Nasopharyngeal; Respirate   Result Value Ref Range    COVID Order Status Received    SARS-CoV-2, PCR (In-House)    Collection Time: 07/29/20  6:30 AM   Result Value Ref Range    SARS-CoV-2 Source Nasal Swab     SARS-CoV-2 (RdRp gene) NotDetected    BETA-HCG QUANTITATIVE SERUM    Collection Time: 07/29/20  7:05 AM   Result Value Ref Range    Bhcg 5506.0 (H) 0.0 - 5.0 mIU/mL   Blood Culture,Hold    Collection Time: 07/29/20  7:05 AM   Result Value Ref Range    Blood Culture Hold Collected    CBC WITHOUT DIFFERENTIAL    Collection Time: 07/29/20  8:03 AM   Result Value Ref Range    WBC  11.9 (H) 4.8 - 10.8 K/uL    RBC 2.48 (L) 4.20 - 5.40 M/uL    Hemoglobin 7.5 (L) 12.0 - 16.0 g/dL    Hematocrit 23.4 (L) 37.0 - 47.0 %    MCV 94.0 81.4 - 97.8 fL    MCH 30.2 27.0 - 33.0 pg    MCHC 32.2 (L) 33.6 - 35.0 g/dL    RDW 48.7 35.9 - 50.0 fL    Platelet Count 114 (L) 164 - 446 K/uL    MPV 10.8 9.0 - 12.9 fL   BASIC METABOLIC PANEL    Collection Time: 07/29/20  8:03 AM   Result Value Ref Range    Sodium 139 135 - 145 mmol/L    Potassium 4.0 3.6 - 5.5 mmol/L    Chloride 108 96 - 112 mmol/L    Co2 19 (L) 20 - 33 mmol/L    Glucose 218 (H) 65 - 99 mg/dL    Bun 12 8 - 22 mg/dL    Creatinine 0.96 0.50 - 1.40 mg/dL    Calcium 6.7 (LL) 8.5 - 10.5 mg/dL    Anion Gap 12.0 7.0 - 16.0   PHOSPHORUS    Collection Time: 07/29/20  8:03 AM   Result Value Ref Range    Phosphorus 3.7 2.5 - 4.5 mg/dL   MAGNESIUM    Collection Time: 07/29/20  8:03 AM   Result Value Ref Range    Magnesium 1.4 (L) 1.5 - 2.5 mg/dL   ESTIMATED GFR    Collection Time: 07/29/20  8:03 AM   Result Value Ref Range    GFR If African American >60 >60 mL/min/1.73 m 2    GFR If Non African American >60 >60 mL/min/1.73 m 2   CBC WITH DIFFERENTIAL    Collection Time: 07/29/20  9:00 AM   Result Value Ref Range    WBC 7.9 4.8 - 10.8 K/uL    RBC 2.26 (L) 4.20 - 5.40 M/uL    Hemoglobin 6.9 (L) 12.0 - 16.0 g/dL    Hematocrit 21.3 (L) 37.0 - 47.0 %    MCV 94.2 81.4 - 97.8 fL    MCH 30.5 27.0 - 33.0 pg    MCHC 32.4 (L) 33.6 - 35.0 g/dL    RDW 48.1 35.9 - 50.0 fL    Platelet Count 95 (L) 164 - 446 K/uL    MPV 10.9 9.0 - 12.9 fL    Neutrophils-Polys 87.50 (H) 44.00 - 72.00 %    Lymphocytes 7.30 (L) 22.00 - 41.00 %    Monocytes 4.50 0.00 - 13.40 %    Eosinophils 0.00 0.00 - 6.90 %    Basophils 0.30 0.00 - 1.80 %    Immature Granulocytes 0.40 0.00 - 0.90 %    Nucleated RBC 0.00 /100 WBC    Neutrophils (Absolute) 6.93 2.00 - 7.15 K/uL    Lymphs (Absolute) 0.58 (L) 1.00 - 4.80 K/uL    Monos (Absolute) 0.36 0.00 - 0.85 K/uL    Eos (Absolute) 0.00 0.00 - 0.51 K/uL    Baso  (Absolute) 0.02 0.00 - 0.12 K/uL    Immature Granulocytes (abs) 0.03 0.00 - 0.11 K/uL    NRBC (Absolute) 0.00 K/uL   BASIC METABOLIC PANEL    Collection Time: 07/29/20  9:00 AM   Result Value Ref Range    Sodium 137 135 - 145 mmol/L    Potassium 4.1 3.6 - 5.5 mmol/L    Chloride 106 96 - 112 mmol/L    Co2 21 20 - 33 mmol/L    Glucose 170 (H) 65 - 99 mg/dL    Bun 11 8 - 22 mg/dL    Creatinine 0.77 0.50 - 1.40 mg/dL    Calcium 8.0 (L) 8.5 - 10.5 mg/dL    Anion Gap 10.0 7.0 - 16.0   ESTIMATED GFR    Collection Time: 07/29/20  9:00 AM   Result Value Ref Range    GFR If African American >60 >60 mL/min/1.73 m 2    GFR If Non African American >60 >60 mL/min/1.73 m 2   PLATELETS REQUEST    Collection Time: 07/29/20  9:48 AM   Result Value Ref Range    Component P       P2                  Plts,Pheresis       [id number]   transfused   07/29/20   10:01      Product Type Platelets  Pheresis LR     Dispense Status transfused     Unit Number (Barcoded) [id number]     Product Code (Barcoded) X1633O83     Blood Type (Barcoded) 6200    Histology Request    Collection Time: 07/29/20  9:53 AM   Result Value Ref Range    Pathology Request Sent to Histo    CBC with Differential    Collection Time: 07/30/20  4:44 AM   Result Value Ref Range    WBC 5.2 4.8 - 10.8 K/uL    RBC 3.05 (L) 4.20 - 5.40 M/uL    Hemoglobin 9.2 (L) 12.0 - 16.0 g/dL    Hematocrit 26.4 (L) 37.0 - 47.0 %    MCV 86.6 81.4 - 97.8 fL    MCH 30.2 27.0 - 33.0 pg    MCHC 34.8 33.6 - 35.0 g/dL    RDW 49.9 35.9 - 50.0 fL    Platelet Count 123 (L) 164 - 446 K/uL    MPV 10.8 9.0 - 12.9 fL    Neutrophils-Polys 67.00 44.00 - 72.00 %    Lymphocytes 21.40 (L) 22.00 - 41.00 %    Monocytes 10.80 0.00 - 13.40 %    Eosinophils 0.40 0.00 - 6.90 %    Basophils 0.00 0.00 - 1.80 %    Immature Granulocytes 0.40 0.00 - 0.90 %    Nucleated RBC 0.00 /100 WBC    Neutrophils (Absolute) 3.48 2.00 - 7.15 K/uL    Lymphs (Absolute) 1.11 1.00 - 4.80 K/uL    Monos (Absolute) 0.56 0.00 - 0.85  K/uL    Eos (Absolute) 0.02 0.00 - 0.51 K/uL    Baso (Absolute) 0.00 0.00 - 0.12 K/uL    Immature Granulocytes (abs) 0.02 0.00 - 0.11 K/uL    NRBC (Absolute) 0.00 K/uL       Assessment and Plan  Course complicated by  ACUTE BLOOD LOSS.   CORRECTED WITH BLOOD TRANSFUSIUON PRE AND INTR-OPERATIVELY.  No areas of skin breakdown/redness; surgical incision intact/healing    Continue Routine postOPERATIVE  care

## 2020-07-30 NOTE — PROGRESS NOTES
Report received from Eugenia TURNER, Pt resting in bed, complaining of right shoulder pain, heat packs applied to help with pain, pain medication given, will try to ambulate patient after pain is more controlled.

## 2020-07-30 NOTE — PROGRESS NOTES
Pt ambulated to the bathroom with assistance of RN and CNA, tolerated well, but complaining of pain in the incision side. Will continue to control pain with PRN medications and to encourage ambulation.

## 2020-07-30 NOTE — CARE PLAN
Problem: Infection  Goal: Will remain free from infection  Outcome: PROGRESSING AS EXPECTED  Note: Pt VS WDL will continue to monitor for S/S of infection.      Problem: Pain Management  Goal: Pain level will decrease to patient's comfort goal  Outcome: PROGRESSING AS EXPECTED  Note: Pain controlled with prn medications per mar. Pt will call to request pain medications. Will offer pain medications as they become available. Pain management expectations discussed with pt, plan made for the day regarding how to address pain.

## 2020-07-31 VITALS
WEIGHT: 200 LBS | HEART RATE: 76 BPM | DIASTOLIC BLOOD PRESSURE: 56 MMHG | TEMPERATURE: 98.2 F | BODY MASS INDEX: 27.09 KG/M2 | OXYGEN SATURATION: 98 % | SYSTOLIC BLOOD PRESSURE: 101 MMHG | RESPIRATION RATE: 16 BRPM | HEIGHT: 72 IN

## 2020-07-31 PROCEDURE — A9270 NON-COVERED ITEM OR SERVICE: HCPCS | Performed by: OBSTETRICS & GYNECOLOGY

## 2020-07-31 PROCEDURE — 700111 HCHG RX REV CODE 636 W/ 250 OVERRIDE (IP): Performed by: OBSTETRICS & GYNECOLOGY

## 2020-07-31 PROCEDURE — 700112 HCHG RX REV CODE 229: Performed by: OBSTETRICS & GYNECOLOGY

## 2020-07-31 PROCEDURE — 700102 HCHG RX REV CODE 250 W/ 637 OVERRIDE(OP): Performed by: OBSTETRICS & GYNECOLOGY

## 2020-07-31 RX ORDER — OXYCODONE HYDROCHLORIDE 10 MG/1
10 TABLET ORAL
Qty: 30 TAB | Refills: 0 | Status: SHIPPED | OUTPATIENT
Start: 2020-07-31 | End: 2020-08-30

## 2020-07-31 RX ORDER — BISACODYL 10 MG
10 SUPPOSITORY, RECTAL RECTAL ONCE
Status: COMPLETED | OUTPATIENT
Start: 2020-07-31 | End: 2020-07-31

## 2020-07-31 RX ORDER — IBUPROFEN 800 MG/1
800 TABLET ORAL 3 TIMES DAILY
Qty: 30 TAB | Refills: 1 | Status: SHIPPED | OUTPATIENT
Start: 2020-07-31

## 2020-07-31 RX ORDER — PSEUDOEPHEDRINE HCL 30 MG
100 TABLET ORAL 2 TIMES DAILY
Qty: 60 CAP | Refills: 1 | Status: SHIPPED | OUTPATIENT
Start: 2020-07-31

## 2020-07-31 RX ADMIN — OXYCODONE 10 MG: 5 TABLET ORAL at 03:52

## 2020-07-31 RX ADMIN — DOCUSATE SODIUM 100 MG: 100 CAPSULE, LIQUID FILLED ORAL at 05:52

## 2020-07-31 RX ADMIN — ONDANSETRON 4 MG: 2 INJECTION INTRAMUSCULAR; INTRAVENOUS at 01:56

## 2020-07-31 RX ADMIN — ACETAMINOPHEN 1000 MG: 500 TABLET ORAL at 08:04

## 2020-07-31 RX ADMIN — ACETAMINOPHEN 1000 MG: 500 TABLET ORAL at 01:46

## 2020-07-31 RX ADMIN — IBUPROFEN 800 MG: 200 TABLET, FILM COATED ORAL at 08:04

## 2020-07-31 RX ADMIN — OXYCODONE 5 MG: 5 TABLET ORAL at 15:11

## 2020-07-31 RX ADMIN — ACETAMINOPHEN 1000 MG: 500 TABLET ORAL at 14:06

## 2020-07-31 RX ADMIN — OXYCODONE 10 MG: 5 TABLET ORAL at 10:11

## 2020-07-31 RX ADMIN — IBUPROFEN 800 MG: 200 TABLET, FILM COATED ORAL at 14:10

## 2020-07-31 RX ADMIN — BISACODYL 10 MG: 10 SUPPOSITORY RECTAL at 12:30

## 2020-07-31 NOTE — PROGRESS NOTES
0700-Bedside report received from Nora TURNER. Patient denies any needs at this time. Assumed care of patient.  0745-Patient complained of shoulder pain and incisional pain rating pain 9 out of 10. Patient medicated with scheduled Tylenol and Ibuprofen and also medicated with Roxicodone. Patient states she is not passing flatus. Active bowel sounds ausculted to all four quadrants of abdomen. Informed patient that she need to ambulate at least QID today to assisted with relief of gas pain, K-pad ordered and given mint tea. Patient requested to wait until pain medication had worked before removing indwelling dent catheter.  1000-Indwelling dent catheter removed. Patient was slightly dizzy sitting at the edge of the bed which resolved. Patient assisted out of bed to bathroom for miki care. Gait was very slow and patient needed assistance of 1 person to ambulate. Patient denies dizziness when ambulating.  1330-Patient was medicated with Roxicodone 10 mg at 1233 for incisional pain rating 9 out of 10. Upon pain reassessment patient states her pain is still 9 out of 10 but declines Dilaudid at this time stating she wants to sleep for an hour and then reassess her pain.  1430-Patient assisted out of bed to bathroom and taught miki care.  Patient voided 400 ml without difficulty.  Patient ambulated with slow but steady gait. Patient given an abdominal binder for support while ambulating.  Denies any dizziness. Patient then ambulated to nurses's station 32 and back to room S329.   1445-Patient states after ambulating her pain is still 9 out of 10 and requested Dilaudid.  1545-Patient slept after Dilaudid and states her pain is now a 6 out of 10 and is manageable.  1700-Patient rating her incisional pain 9 out 10 and was medicated with Roxicodone. Patient states she gas pains and shoulder pain has resolved since ambulating. Patient ambulated in the johnson and back to room. Tolerated well.  1800-Patient now rates her incisional  pain 6 out of 10 and declines need for any additional pain medication for breakthrough pain.  1900-Bedside report given to Renard TURNER who assumed care of patient.

## 2020-07-31 NOTE — DISCHARGE PLANNING
Medication reconcilliation completed. Medications delivered to patient at bedside. Patient counseled. Pharmacy OOS of oxycodone 10mg. Patient received hard copy at discharge.       Natalie Pop   Home Medication Instructions JORGE L:[id number]    Printed on:07/31/20 2554   Medication Information                      docusate sodium 100 MG Cap  Take 100 mg by mouth 2 Times a Day.             ibuprofen (MOTRIN) 800 MG Tab  Take 1 Tab by mouth 3 times a day.

## 2020-07-31 NOTE — PROGRESS NOTES
Pt states understanding of all discharge info and follow up appts. Per Dr Potter, Okay to give note to pt to be off work for 2 weeks and pt must make appointment to be seen at 's office prior to returning to work.

## 2020-07-31 NOTE — PROGRESS NOTES
"Report received, patient assessed. Patient stable. Provided pain medication and ice for patient's pain. Patient reports that she has been able to do more today and now her pain is coming back. Re-assured patient this is normal and it is important to continue to ambulate. No further questions/needs at this time. Call light within reach.     0150-Patient had small episode of emesis after taking tylenol, asked patient if she has been eating and patient states, \"no not really.\" Educated patient that pain meds can cause an upset stomach, patient verbalizes understanding. Zofran given, patient reports immediate relief.     0400-Called to patient's room, patient states that her IV in her right arm is hurting, flushed IV, catheter bent and IV infiltrated. Patient states, \"I've been poked so much and they hurt.\" Removed IV, will follow up with MD to see if patient needs a new IV placed. Patient still has IV in left wrist which is working well.     0600-Patient sitting up in chair, states that she has been having weird dreams. Patient able to ambulate back to bed with no assistance. Patient is still having pain control issues and states, \"I am worried about going home because of my daughter and her jumping on me.\" Reassurance provided.   "

## 2020-07-31 NOTE — CARE PLAN
Problem: Pain Management  Goal: Pain level will decrease to patient's comfort goal  Outcome: PROGRESSING SLOWER THAN EXPECTED  Note: Patient was medicated 3 times during day shift with Roxicodone for complaint of incisional pain rating pain 9 out of 10 and once with Dilaudid. Pain management has improved and at 1900 rated incisional pain 6 out of 10.     Problem: Urinary Elimination:  Goal: Ability to reestablish a normal urinary elimination pattern will improve  Outcome: PROGRESSING AS EXPECTED

## 2020-07-31 NOTE — DISCHARGE INSTRUCTIONS
"Discharge Instructions    Discharged to home by car with escort. Discharged via wheelchair, hospital escort: Yes.  Special equipment needed: Not Applicable    Be sure to schedule a follow-up appointment with your primary care doctor or any specialists as instructed.     Discharge Plan:        I understand that a diet low in cholesterol, fat, and sodium is recommended for good health. Unless I have been given specific instructions below for another diet, I accept this instruction as my diet prescription.       Special Instructions: see print out \"after Laparoscopic treatment of ectopic pregnancy\"    · Is patient discharged on Warfarin / Coumadin?   No     Depression / Suicide Risk    As you are discharged from this [facility] facility, it is important to learn how to keep safe from harming yourself.    Recognize the warning signs:  · Abrupt changes in personality, positive or negative- including increase in energy   · Giving away possessions  · Change in eating patterns- significant weight changes-  positive or negative  · Change in sleeping patterns- unable to sleep or sleeping all the time   · Unwillingness or inability to communicate  · Depression  · Unusual sadness, discouragement and loneliness  · Talk of wanting to die  · Neglect of personal appearance   · Rebelliousness- reckless behavior  · Withdrawal from people/activities they love  · Confusion- inability to concentrate     If you or a loved one observes any of these behaviors or has concerns about self-harm, here's what you can do:  · Talk about it- your feelings and reasons for harming yourself  · Remove any means that you might use to hurt yourself (examples: pills, rope, extension cords, firearm)  · Get professional help from the community (Mental Health, Substance Abuse, psychological counseling)  · Do not be alone:Call your Safe Contact- someone whom you trust who will be there for you.  · Call your local CRISIS HOTLINE 974-7895 or " 230-743-7011  · Call your local Children's Mobile Crisis Response Team [place] (480) 614-8739 or [email or web address]  · Call the toll free National Suicide Prevention Hotlines   · National Suicide Prevention Lifeline 711-228-NOSA (8539)  · National drchrono Line Network 800-SUICIDE (274-2114)

## 2020-07-31 NOTE — DISCHARGE SUMMARY
Discharge Summary:      Natalie Pop      Admit Date:   2020  Discharge Date:  2020     Admitting diagnosis:  Ruptured left tubal ectopic pregnancy causing hemoperitoneum  Ruptured left tubal ectopic pregnancy causing hemoperitoneum  Ruptured left tubal ectopic pregnancy causing hemoperitoneum  Discharge Diagnosis: Status post laporotomy and left salpingectomy..  Pregnancy Complications:ectopic pregnancy.   S/P -sterilization.      History:  Past Medical History:   Diagnosis Date   • Ectopic pregnancy 2018   • Fibroid tumor    • Inflammatory disease of ovary, fallopian tube, pelvic cellular tissue, and peritoneum    • Miscarriage 2020     OB History    Para Term  AB Living   5 2 2   1 2   SAB TAB Ectopic Molar Multiple Live Births     1       2      # Outcome Date GA Lbr Hussain/2nd Weight Sex Delivery Anes PTL Lv   5             4 Term 04/13/15 40w0d  3.033 kg (6 lb 11 oz) F CS-LTranv Spinal N LONG      Birth Comments: C/Section for repeat    3 Term 12 40w0d  3.487 kg (7 lb 11 oz) M CS-LTranv Spinal N LONG      Birth Comments: baby's heart rate was going down   2 TAB  4w0d          1                  Pcn [penicillins]  Patient Active Problem List    Diagnosis Date Noted   • S/P  2018   • PUPPP (pruritic urticarial papules and plaques of pregnancy) 2018   • Trichomoniasis 05/15/2018   • Supervision of other high risk pregnancies, third trimester 05/15/2018   • History of  delivery affecting pregnancy 2018        Hospital Course:   25 y.o. , now para 2, was admitted with the above mentioned diagnosis, underwent laparotomy and left salpingectomy., . Patient postoperative  course was unremarkable, with progressive advancement in diet , ambulation and toleration of oral analgesia. Patient with minor  complaints today and desires discharge.      Vitals:    20 1800 20 2200 20 0200 20 0623    BP: 101/66 121/57 113/60 (!) 99/54   Pulse: 84 64 76 73   Resp: 18 18 18 18   Temp: 36.6 °C (97.8 °F) 36.6 °C (97.9 °F) 36.6 °C (97.8 °F) 36.1 °C (97 °F)   TempSrc: Temporal Temporal Temporal Temporal   SpO2: 95% 96% 95% 95%   Weight:       Height:           Current Facility-Administered Medications   Medication Dose   • bisacodyl (DULCOLAX) suppository 10 mg  10 mg   • Pharmacy Consult Request ...Pain Management Review 1 Each  1 Each   • ondansetron (ZOFRAN) syringe/vial injection 4 mg  4 mg   • dexamethasone (DECADRON) injection 4 mg  4 mg   • diphenhydrAMINE (BENADRYL) injection 25 mg  25 mg   • haloperidol lactate (HALDOL) injection 1 mg  1 mg   • scopolamine (TRANSDERM-SCOP) patch 1 Patch  1 Patch   • docusate sodium (COLACE) capsule 100 mg  100 mg   • polyethylene glycol/lytes (MIRALAX) PACKET 1 Packet  1 Packet   • magnesium hydroxide (MILK OF MAGNESIA) suspension 30 mL  30 mL   • bisacodyl (DULCOLAX) suppository 10 mg  10 mg   • fleet enema 133 mL  1 Each   • acetaminophen (TYLENOL) tablet 1,000 mg  1,000 mg   • ibuprofen (MOTRIN) tablet 800 mg  800 mg   • oxyCODONE immediate-release (ROXICODONE) tablet 5 mg  5 mg   • oxyCODONE immediate-release (ROXICODONE) tablet 10 mg  10 mg   • HYDROmorphone pf (DILAUDID) injection 0.5 mg  0.5 mg   • simethicone (MYLICON) chewable tab 80 mg  80 mg       Exam:  Breast Exam: negative  Abdomen: Abdomen soft, non-tender. BS normal. No masses,  No organomegaly  Incision: healing well with good reapproximation  Perineum: perineum intact  Extremity: extremities, peripheral pulses and reflexes normal     Labs:  Recent Labs     07/29/20  0803 07/29/20  0900 07/30/20  0444   WBC 11.9* 7.9 5.2   RBC 2.48* 2.26* 3.05*   HEMOGLOBIN 7.5* 6.9* 9.2*   HEMATOCRIT 23.4* 21.3* 26.4*   MCV 94.0 94.2 86.6   MCH 30.2 30.5 30.2   MCHC 32.2* 32.4* 34.8   RDW 48.7 48.1 49.9   PLATELETCT 114* 95* 123*   MPV 10.8 10.9 10.8        Activity:   Discharge to home  Pelvic Rest x 6  weeks    Assessment:  normal postoperative  course  Discharge Assessment: No areas of skin breakdown/redness; surgical incision intact/healing     Follow up: 2 weeks in office.     Discharge Meds:   Current Outpatient Medications   Medication Sig Dispense Refill   • docusate sodium 100 MG Cap Take 100 mg by mouth 2 Times a Day. 60 Cap 1   • ibuprofen (MOTRIN) 800 MG Tab Take 1 Tab by mouth 3 times a day. 30 Tab 1   • oxyCODONE immediate-release (ROXICODONE) 10 MG immediate release tablet Take 1 Tab by mouth every 3 hours as needed for up to 30 days. 30 Tab 0       Breanna Argueta M.D.

## 2020-08-26 NOTE — PROGRESS NOTES
Pt refusing dulcolax at this time.  Pt states she wants to rest unitl noon. Assessment wnl. Pt passing flatus. Call bell in reach. Cold and hot packs provided to pt for comfort. Call bell in reach.    <--- Click to Launch ICDx for PreOp, PostOp and Procedure

## 2021-05-28 NOTE — ANESTHESIA POSTPROCEDURE EVALUATION
Patient: Natalie Cleveland Forcino-Zaheer    Procedure Summary     Date:  07/29/20 Room / Location:  [place] [address] / SURGERY [facility]    Anesthesia Start:  0738 Anesthesia Stop:  0919    Procedures:       DILATION AND CURETTAGE      LAPAROTOMY, OPEN, SALPINGECTOMY (Left Abdomen) Diagnosis:  (Ruptured Ectopic)    Surgeon:  Breanna Argueta M.D. Responsible Provider:  Junior Sharpe III, M.D.    Anesthesia Type:  general ASA Status:  5 - Emergent          Final Anesthesia Type: general  Last vitals  BP   Blood Pressure: 112/63, Arterial BP: 117/57    Temp   36.4 °C (97.6 °F)    Pulse   Pulse: 95   Resp   16    SpO2   97 %      Anesthesia Post Evaluation    Patient location during evaluation: PACU  Patient participation: complete - patient participated  Level of consciousness: awake and alert  Pain score: 8  Acute post op pain being well managed by recovery room staff  Airway patency: patent  Anesthetic complications: no  Cardiovascular status: hemodynamically stable  Respiratory status: acceptable  Hydration status: euvolemic    PONV: none           Nurse Pain Score: 8 (NPRS)        
28-May-2021 18:15

## (undated) DEVICE — SET EXTENSION WITH 2 PORTS (48EA/CA) ***PART #2C8610 IS A SUBSTITUTE*****

## (undated) DEVICE — NEEDLE INSFL 120MM 14GA VRRS - (20/BX)

## (undated) DEVICE — GLOVE, BIOGEL ECLIPSE, SZ 7.0, PF LTX (50/BX)

## (undated) DEVICE — DRESSING TRANSPARENT FILM TEGADERM 2.375 X 2.75"  (100EA/BX)"

## (undated) DEVICE — SET LEADWIRE 5 LEAD BEDSIDE DISPOSABLE ECG (1SET OF 5/EA)

## (undated) DEVICE — SUTURE GENERAL

## (undated) DEVICE — TROCAR 5X100 BLADED Z-THREAD - KII (6/BX)

## (undated) DEVICE — ELECTRODE DUAL RETURN W/ CORD - (50/PK)

## (undated) DEVICE — GLOVE BIOGEL SZ 6.5 SURGICAL PF LTX (50PR/BX 4BX/CA)

## (undated) DEVICE — SUTURE 0 VICRYL PLUS CT-1 - 36 INCH (36/BX)

## (undated) DEVICE — SUTURE 4-0 MONOCRYL PLUS PS-2 - 27 INCH (36/BX)

## (undated) DEVICE — KIT ANESTHESIA W/CIRCUIT & 3/LT BAG W/FILTER (20EA/CA)

## (undated) DEVICE — TRAY SPINAL ANESTHESIA NON-SAFETY (10/CA)

## (undated) DEVICE — TUBING CLEARLINK DUO-VENT - C-FLO (48EA/CA)

## (undated) DEVICE — PAD LAP STERILE 18 X 18 - (5/PK 40PK/CA)

## (undated) DEVICE — SUTURE 3-0 CHROMIC GUT SH 27 (36PK/BX)"

## (undated) DEVICE — CANISTER SUCTION 3000ML MECHANICAL FILTER AUTO SHUTOFF MEDI-VAC NONSTERILE LF DISP  (40EA/CA)

## (undated) DEVICE — NEPTUNE 4 PORT MANIFOLD - (20/PK)

## (undated) DEVICE — GLOVE BIOGEL INDICATOR SZ 7.5 SURGICAL PF LTX - (50PR/BX 4BX/CA)

## (undated) DEVICE — TUBING SETDISPOS HIGH FLOW II - (10/BX)

## (undated) DEVICE — SET SUCTION/IRRIGATION WITH DISPOSABLE TIP (6/CA )PART #0250-070-520 IS A SUB

## (undated) DEVICE — MASK ANESTHESIA ADULT  - (100/CA)

## (undated) DEVICE — TRAY SRGPRP PVP IOD WT PRP - (20/CA)

## (undated) DEVICE — SUCTION INSTRUMENT YANKAUER BULBOUS TIP W/O VENT (50EA/CA)

## (undated) DEVICE — SODIUM CHL IRRIGATION 0.9% 1000ML (12EA/CA)

## (undated) DEVICE — SUTURE 0 VICRYL PLUS CT-2 - 27 INCH (36/BX)

## (undated) DEVICE — SUTURE 3-0 VICRYL PLUS SH - 8X 18 INCH (12/BX)

## (undated) DEVICE — WATER IRRIGATION STERILE 1000ML (12EA/CA)

## (undated) DEVICE — CANNULA W/SEAL 5X100 Z-THRE - ADED KII (12/BX)

## (undated) DEVICE — GLOVE BIOGEL INDICATOR SZ 7SURGICAL PF LTX - (50/BX 4BX/CA)

## (undated) DEVICE — TROCAR Z THREAD 12 X 100 - BLADED (6/BX)

## (undated) DEVICE — CATHETER IV NON-SAFETY 18 GA X 1 1/4 (50/BX 4BX/CA)

## (undated) DEVICE — TRAY SURESTEP FOLEY TEMP SENSING 16FR (10EA/CA) ORDER  #18764 FOR TEMP FOLEY ONLY

## (undated) DEVICE — ELECTRODE 850 FOAM ADHESIVE - HYDROGEL RADIOTRNSPRNT (50/PK)

## (undated) DEVICE — GLOVE BIOGEL SZ 8 SURGICAL PF LTX - (50PR/BX 4BX/CA)

## (undated) DEVICE — GOWN SURGEONS X-LARGE - DISP. (30/CA)

## (undated) DEVICE — LACTATED RINGERS INJ 1000 ML - (14EA/CA 60CA/PF)

## (undated) DEVICE — SENSOR SPO2 NEO LNCS ADHESIVE (20/BX) SEE USER NOTES

## (undated) DEVICE — PACK LAPAROSCOPY - (1/CA)

## (undated) DEVICE — GOWN WARMING STANDARD FLEX - (30/CA)

## (undated) DEVICE — DRAPESURG STERI-DRAPE LONG - (10/BX 4BX/CA)

## (undated) DEVICE — STAPLER SKIN DISP - (6/BX 10BX/CA) VISISTAT

## (undated) DEVICE — DRAPE STRLE REG TOWEL 18X24 - (10/BX 4BX/CA)"

## (undated) DEVICE — ARMREST CRADLE FOAM - (2PR/PK 12PR/CA)

## (undated) DEVICE — SPONGE GAUZESTER. 2X2 4-PL - (2/PK 50PK/BX 30BX/CS)

## (undated) DEVICE — GLOVE BIOGEL SZ 7.5 SURGICAL PF LTX - (50PR/BX 4BX/CA)

## (undated) DEVICE — KIT  I.V. START (100EA/CA)

## (undated) DEVICE — LIGASURE TISSUE FUSION  - SINGLE USE (6/CA)

## (undated) DEVICE — DETERGENT RENUZYME PLUS 10 OZ PACKET (50/BX)

## (undated) DEVICE — 0 CHROMIC CT-1

## (undated) DEVICE — PROTECTOR ULNA NERVE - (36PR/CA)

## (undated) DEVICE — HEAD HOLDER JUNIOR/ADULT

## (undated) DEVICE — SUTURE 2-0 CHROMIC GUT CT-1 27 (36PK/BX)"

## (undated) DEVICE — SUTURE 0 VICRYL PLUS UR-6 - 27 INCH (36/BX)

## (undated) DEVICE — BAG RETRIEVAL 10ML (10EA/BX)

## (undated) DEVICE — KIT ROOM DECONTAMINATION

## (undated) DEVICE — DRESSING NON-ADHERING 8 X 3 - (50/BX)

## (undated) DEVICE — PACK C-SECTION (2EA/CA)

## (undated) DEVICE — WATER IRRIG. STER. 1500 ML - (9/CA)

## (undated) DEVICE — LIGASURE 5MM BLUNT TIP LONG - 44CM (6EA/PK)